# Patient Record
Sex: FEMALE | Race: WHITE | Employment: UNEMPLOYED
[De-identification: names, ages, dates, MRNs, and addresses within clinical notes are randomized per-mention and may not be internally consistent; named-entity substitution may affect disease eponyms.]

---
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
## 2021-02-25 ENCOUNTER — OFFICE VISIT (OUTPATIENT)
Dept: OBGYN | Age: 21
End: 2021-02-25
Payer: COMMERCIAL

## 2021-02-25 VITALS
DIASTOLIC BLOOD PRESSURE: 82 MMHG | BODY MASS INDEX: 33.15 KG/M2 | WEIGHT: 199 LBS | SYSTOLIC BLOOD PRESSURE: 128 MMHG | HEIGHT: 65 IN

## 2021-02-25 DIAGNOSIS — N83.201 CYST OF RIGHT OVARY: Primary | ICD-10-CM

## 2021-02-25 DIAGNOSIS — N93.8 DUB (DYSFUNCTIONAL UTERINE BLEEDING): ICD-10-CM

## 2021-02-25 PROCEDURE — G8417 CALC BMI ABV UP PARAM F/U: HCPCS | Performed by: ADVANCED PRACTICE MIDWIFE

## 2021-02-25 PROCEDURE — G8484 FLU IMMUNIZE NO ADMIN: HCPCS | Performed by: ADVANCED PRACTICE MIDWIFE

## 2021-02-25 PROCEDURE — 99213 OFFICE O/P EST LOW 20 MIN: CPT | Performed by: ADVANCED PRACTICE MIDWIFE

## 2021-02-25 PROCEDURE — 1036F TOBACCO NON-USER: CPT | Performed by: ADVANCED PRACTICE MIDWIFE

## 2021-02-25 PROCEDURE — G8427 DOCREV CUR MEDS BY ELIG CLIN: HCPCS | Performed by: ADVANCED PRACTICE MIDWIFE

## 2021-02-25 NOTE — PROGRESS NOTES
PROBLEM VISIT     Date of service: 2021    Moore Reyna  Is a 24 y.o.  female    PT's PCP is: OSWALDO Vail - CNP     : 2000                                             Subjective:       No LMP recorded (lmp unknown). Patient has had an implant. OB History    Para Term  AB Living   3 3 3 0 0 3   SAB TAB Ectopic Molar Multiple Live Births   0 0 0   0 3      # Outcome Date GA Lbr Abdiel/2nd Weight Sex Delivery Anes PTL Lv   3 Term 20 38w3d / 00:04 6 lb 12.7 oz (3.081 kg) M Vag-Spont None N KAY   2 Term 19 38w6d 05:11 / 00:01 6 lb 0.9 oz (2.747 kg) F Vag-Spont  N KAY      Complications: Oligohydramnios   1 Term 05/15/17 39w5d 12:11 / 00:52 7 lb 7.5 oz (3.388 kg) M Vag-Spont EPI N KAY        Social History     Tobacco Use   Smoking Status Passive Smoke Exposure - Never Smoker   Smokeless Tobacco Never Used        Social History     Substance and Sexual Activity   Alcohol Use No       Social History     Substance and Sexual Activity   Sexual Activity Yes    Partners: Male    Birth control/protection: I.U.D. Comment: Mirena        Allergies: Patient has no known allergies. Chief Complaint   Patient presents with    Ovarian Cyst     Pt here for follow up ultrasound with concerns of an ovarian cyst. Pt states no questions or concerns otherwise at this time. Last Yearly:  never    Last pap: never    Last HPV: never    PE:  Vital Signs  Blood pressure 128/82, height 5' 5\" (1.651 m), weight 199 lb (90.3 kg), not currently breastfeeding. Estimated body mass index is 33.12 kg/m² as calculated from the following:    Height as of this encounter: 5' 5\" (1.651 m). Weight as of this encounter: 199 lb (90.3 kg).     No data recorded      NURSE: Ellie    HPI: Patient here today for review of right ovarian cyst.  Patient states she is feeling better at this time    Yes  PT denies fever, chills, nausea and vomiting           Results reviewed today: UTERUS: Homogenous appearing retroverted uterus, WNL     ENDO: measures 5 mm, IUD noted in good position     RT. OVARY: WNL, complete resolution of 5 cm simple cyst     LT. OVARY: 3 x 2.5 x 3 cm simple cyst     Trace free fluid in cul-de-sac                                                   Assessment and Plan          Diagnosis Orders   1. Cyst of right ovary     2. DUB (dysfunctional uterine bleeding)         We did review normal cystic activity with Mirena IUDs plan of care reviewed      I am having Debra Balzarine. Michael maintain her traZODone, VORTIoxetine, and Mirena (52 MG). Return in about 1 year (around 2/25/2022) for yearly no pap. She was also counseled on her preventative health maintenance recommendations and follow-up. There are no Patient Instructions on file for this visit.     Caitlin Aparicio,2/25/2021 11:12 AM

## 2021-06-15 ENCOUNTER — HOSPITAL ENCOUNTER (EMERGENCY)
Age: 21
Discharge: HOME | End: 2021-06-15
Attending: EMERGENCY MEDICINE
Payer: COMMERCIAL

## 2021-06-15 VITALS
DIASTOLIC BLOOD PRESSURE: 95 MMHG | OXYGEN SATURATION: 100 % | HEART RATE: 83 BPM | RESPIRATION RATE: 18 BRPM | SYSTOLIC BLOOD PRESSURE: 151 MMHG | TEMPERATURE: 97.7 F

## 2021-06-15 DIAGNOSIS — K02.9 DENTAL CARIES: ICD-10-CM

## 2021-06-15 DIAGNOSIS — K08.89 PAIN, DENTAL: Primary | ICD-10-CM

## 2021-06-15 PROCEDURE — 99283 EMERGENCY DEPT VISIT LOW MDM: CPT

## 2021-06-15 PROCEDURE — 6370000000 HC RX 637 (ALT 250 FOR IP): Performed by: EMERGENCY MEDICINE

## 2021-06-15 RX ORDER — PENICILLIN V POTASSIUM 250 MG/1
500 TABLET ORAL ONCE
Status: COMPLETED | OUTPATIENT
Start: 2021-06-15 | End: 2021-06-15

## 2021-06-15 RX ORDER — NAPROXEN SODIUM 550 MG/1
550 TABLET ORAL ONCE
Status: COMPLETED | OUTPATIENT
Start: 2021-06-15 | End: 2021-06-15

## 2021-06-15 RX ORDER — NAPROXEN 250 MG/1
250 TABLET ORAL 2 TIMES DAILY WITH MEALS
Qty: 180 TABLET | Refills: 1 | Status: SHIPPED | OUTPATIENT
Start: 2021-06-15 | End: 2022-05-26 | Stop reason: ALTCHOICE

## 2021-06-15 RX ORDER — PENICILLIN V POTASSIUM 500 MG/1
500 TABLET ORAL 4 TIMES DAILY
Qty: 20 TABLET | Refills: 0 | Status: SHIPPED | OUTPATIENT
Start: 2021-06-15 | End: 2021-06-25

## 2021-06-15 RX ADMIN — NAPROXEN SODIUM 550 MG: 550 TABLET, FILM COATED ORAL at 22:49

## 2021-06-15 RX ADMIN — PENICILLIN V POTASIUM 500 MG: 250 TABLET ORAL at 22:49

## 2021-06-15 ASSESSMENT — PAIN DESCRIPTION - PAIN TYPE: TYPE: ACUTE PAIN

## 2021-06-15 ASSESSMENT — PAIN SCALES - GENERAL
PAINLEVEL_OUTOF10: 10
PAINLEVEL_OUTOF10: 10

## 2021-06-15 ASSESSMENT — PAIN DESCRIPTION - DESCRIPTORS: DESCRIPTORS: STABBING

## 2021-06-15 ASSESSMENT — PAIN DESCRIPTION - LOCATION: LOCATION: TEETH

## 2021-06-15 ASSESSMENT — PAIN DESCRIPTION - ORIENTATION: ORIENTATION: LEFT

## 2021-06-16 NOTE — ED PROVIDER NOTES
as of 6/15/2021 10:45 PM      CONTINUE these medications which have NOT CHANGED    Details   levonorgestrel (MIRENA, 52 MG,) IUD 52 mg 1 each by Intrauterine route once, Intrauterine, ONCE, Historical Med             ALLERGIES     Patient has no known allergies. FAMILY HISTORY     History reviewed. No pertinent family history. SOCIAL HISTORY       Social History     Socioeconomic History    Marital status: Single     Spouse name: None    Number of children: None    Years of education: None    Highest education level: None   Occupational History    None   Tobacco Use    Smoking status: Passive Smoke Exposure - Never Smoker    Smokeless tobacco: Never Used   Vaping Use    Vaping Use: Never used   Substance and Sexual Activity    Alcohol use: No    Drug use: No    Sexual activity: Yes     Partners: Male     Birth control/protection: I.U.D. Comment: Mirena    Other Topics Concern    None   Social History Narrative    ** Merged History Encounter **          Social Determinants of Health     Financial Resource Strain:     Difficulty of Paying Living Expenses:    Food Insecurity:     Worried About Running Out of Food in the Last Year:     [address] in the Last Year:    Transportation Needs:     Lack of Transportation (Medical):      Lack of Transportation (Non-Medical):    Physical Activity:     Days of Exercise per Week:     Minutes of Exercise per Session:    Stress:     Feeling of Stress :    Social Connections:     Frequency of Communication with Friends and Family:     Frequency of Social Gatherings with Friends and Family:     Attends [employer] Services:     Active Member of Clubs or Organizations:     Attends Club or Organization Meetings:     Marital Status:    Intimate Partner Violence:     Fear of Current or Ex-Partner:     Emotionally Abused:     Physically Abused:     Sexually Abused:        SCREENINGS                        PHYSICAL EXAM    (up to 7 for level 4, 8 or more for level 5)     ED Triage Vitals [06/15/21 2224]   BP Temp Temp Source Pulse Resp SpO2 Height Weight   (!) 151/95 97.7 °F (36.5 °C) Tympanic 83 18 100 % -- --       Physical Exam  Vitals and nursing note reviewed. Constitutional:       General: She is not in acute distress. Appearance: Normal appearance. She is not ill-appearing, toxic-appearing or diaphoretic. HENT:      Head: Normocephalic. Right Ear: Tympanic membrane normal.      Left Ear: Tympanic membrane normal.      Nose: Nose normal. No congestion or rhinorrhea. Mouth/Throat:      Mouth: Mucous membranes are moist.      Comments: Multiple dental caries specifically involving the wisdom teeth-urgent gingival swelling circumventing the left lower third molar    There is no evidence of any trismus appreciated-no impairment of the swallowing mechanism-no clinical evidence to support Keysha's angina  Eyes:      General: No scleral icterus. Pupils: Pupils are equal, round, and reactive to light. Skin:     Findings: No lesion or rash. Neurological:      General: No focal deficit present. Mental Status: She is alert. Cranial Nerves: No cranial nerve deficit. Sensory: No sensory deficit. Motor: No weakness.       Coordination: Coordination normal.         DIAGNOSTIC RESULTS     EKG: All EKG's are interpreted by the Emergency Department Physician who either signs or Co-signs this chart in the absence of a cardiologist.      RADIOLOGY:   Non-plain film images such as CT, Ultrasound and MRI are read by the radiologist. Plain radiographic images are visualized and preliminarily interpreted by the emergency physician with the below findings:      Interpretation per the Radiologist below, if available at the time of this note:    No orders to display         ED BEDSIDE ULTRASOUND:   Performed by ED Physician - none    LABS:  Labs Reviewed - No data to display    All other labs were within normal range or not returned as of this dictation. EMERGENCY DEPARTMENT COURSE and DIFFERENTIAL DIAGNOSIS/MDM:   Vitals:    Vitals:    06/15/21 2224   BP: (!) 151/95   Pulse: 83   Resp: 18   Temp: 97.7 °F (36.5 °C)   TempSrc: Tympanic   SpO2: 100%         MDM  Number of Diagnoses or Management Options  Dental caries  Pain, dental  Diagnosis management comments: 71-year-old female presents emergency department with concerns of discomfort left lower wisdom tooth with swelling a forementioned region. Patient will be initiated on antibiotics advised Tylenol ibuprofen or Naprosyn as directed for control discomfort    There are no red flags    Patient advised keep follow-up appointment with her dentist as previously arranged and scheduled        REASSESSMENT              CRITICAL CARE TIME   Total Critical Care time was minutes, excluding separately reportable procedures. There was a high probability of clinically significant/life threatening deterioration in the patient's condition which required my urgent intervention. CONSULTS:  None    PROCEDURES:  Unless otherwise noted below, none     Procedures    FINAL IMPRESSION      1. Pain, dental    2. Dental caries          DISPOSITION/PLAN   DISPOSITION Decision To Discharge 06/15/2021 10:57:06 PM      PATIENT REFERRED TO:  Your dentist    Call in 2 days        DISCHARGE MEDICATIONS:  Discharge Medication List as of 6/15/2021 10:45 PM      START taking these medications    Details   penicillin v potassium (VEETID) 500 MG tablet Take 1 tablet by mouth 4 times daily for 10 days, Disp-20 tablet, R-0Normal      naproxen (NAPROSYN) 250 MG tablet Take 1 tablet by mouth 2 times daily (with meals), Disp-180 tablet, R-1Normal           Controlled Substances Monitoring:     No flowsheet data found.     (Please note that portions of this note were completed with a voice recognition program.  Efforts were made to edit the dictations but occasionally words are mis-transcribed.)    Ariella Lloyd MD (electronically signed)  Attending Emergency Physician            Vince Amato MD  06/17/21 2024

## 2021-06-17 ASSESSMENT — ENCOUNTER SYMPTOMS
GASTROINTESTINAL NEGATIVE: 1
EYES NEGATIVE: 1
RESPIRATORY NEGATIVE: 1
ALLERGIC/IMMUNOLOGIC NEGATIVE: 1

## 2021-10-06 ENCOUNTER — PROCEDURE VISIT (OUTPATIENT)
Dept: OBGYN | Age: 21
End: 2021-10-06
Payer: COMMERCIAL

## 2021-10-06 VITALS
SYSTOLIC BLOOD PRESSURE: 132 MMHG | HEIGHT: 65 IN | WEIGHT: 181 LBS | BODY MASS INDEX: 30.16 KG/M2 | DIASTOLIC BLOOD PRESSURE: 80 MMHG

## 2021-10-06 DIAGNOSIS — Z30.432 ENCOUNTER FOR IUD REMOVAL: Primary | ICD-10-CM

## 2021-10-06 PROCEDURE — 58301 REMOVE INTRAUTERINE DEVICE: CPT | Performed by: ADVANCED PRACTICE MIDWIFE

## 2021-10-06 NOTE — PROGRESS NOTES
follow-up. ASSESSMENT:   Diagnosis Orders   1.  Encounter for IUD removal  NC REMOVE INTRAUTERINE DEVICE       IUD Removal  Patient Active Problem List    Diagnosis Date Noted    Major depression, chronic 2020    Acetaminophen overdose, intentional self-harm, initial encounter ([facility] 2020    Amniotic fluid leaking 2020    Uterine irritability     Rupture, membranes, premature 2020    Amenorrhea 2020    Impaired glucose tolerance in pregnancy 2020    Sprain of right ankle 2020    Female bladder prolapse 2019     Pt has a pessary placed       (normal spontaneous vaginal delivery)     Concussion with less than 1 hour loss of consciousness 2013         Notify office of positive home pregnancy test

## 2022-05-26 ENCOUNTER — INITIAL PRENATAL (OUTPATIENT)
Dept: OBGYN | Age: 22
End: 2022-05-26
Payer: COMMERCIAL

## 2022-05-26 ENCOUNTER — HOSPITAL ENCOUNTER (OUTPATIENT)
Age: 22
Setting detail: SPECIMEN
Discharge: HOME | End: 2022-05-26
Payer: COMMERCIAL

## 2022-05-26 VITALS — WEIGHT: 184 LBS | SYSTOLIC BLOOD PRESSURE: 116 MMHG | DIASTOLIC BLOOD PRESSURE: 72 MMHG | BODY MASS INDEX: 30.62 KG/M2

## 2022-05-26 DIAGNOSIS — N91.2 AMENORRHEA: ICD-10-CM

## 2022-05-26 DIAGNOSIS — Z34.82 ENCOUNTER FOR SUPERVISION OF OTHER NORMAL PREGNANCY IN SECOND TRIMESTER: Primary | ICD-10-CM

## 2022-05-26 DIAGNOSIS — Z34.82 ENCOUNTER FOR SUPERVISION OF OTHER NORMAL PREGNANCY IN SECOND TRIMESTER: ICD-10-CM

## 2022-05-26 LAB
ABSOLUTE EOS #: 0.08 K/UL (ref 0–0.44)
ABSOLUTE IMMATURE GRANULOCYTE: 0.03 K/UL (ref 0–0.3)
ABSOLUTE LYMPH #: 1.25 K/UL (ref 1.1–3.7)
ABSOLUTE MONO #: 0.38 K/UL (ref 0.1–1.4)
AMPHETAMINE SCREEN URINE: NEGATIVE
BARBITURATE SCREEN URINE: NEGATIVE
BASOPHILS # BLD: 1 % (ref 0–2)
BASOPHILS ABSOLUTE: 0.03 K/UL (ref 0–0.2)
BENZODIAZEPINE SCREEN, URINE: NEGATIVE
BUPRENORPHINE URINE: NEGATIVE
CANNABINOID SCREEN URINE: NEGATIVE
COCAINE METABOLITE, URINE: NEGATIVE
EOSINOPHILS RELATIVE PERCENT: 1 % (ref 1–4)
HCT VFR BLD CALC: 34.9 % (ref 36.3–47.1)
HEMOGLOBIN: 12 G/DL (ref 11.9–15.1)
HEPATITIS B SURFACE ANTIGEN: NONREACTIVE
HEPATITIS C ANTIBODY: NONREACTIVE
IMMATURE GRANULOCYTES: 1 %
LYMPHOCYTES # BLD: 19 % (ref 25–45)
MCH RBC QN AUTO: 29.9 PG (ref 25.2–33.5)
MCHC RBC AUTO-ENTMCNC: 34.4 G/DL (ref 28.4–34.8)
MCV RBC AUTO: 87 FL (ref 82.6–102.9)
METHADONE SCREEN, URINE: NEGATIVE
METHAMPHETAMINE, URINE: NEGATIVE
MONOCYTES # BLD: 6 % (ref 2–8)
NRBC AUTOMATED: 0 PER 100 WBC
OPIATES, URINE: NEGATIVE
OXYCODONE SCREEN URINE: NEGATIVE
PDW BLD-RTO: 13 % (ref 11.8–14.4)
PHENCYCLIDINE, URINE: NEGATIVE
PLATELET # BLD: 215 K/UL (ref 138–453)
PMV BLD AUTO: 9.8 FL (ref 8.1–13.5)
PROPOXYPHENE, URINE: NEGATIVE
RBC # BLD: 4.01 M/UL (ref 3.95–5.11)
RUBV IGG SER QL: 38.8 IU/ML
SEG NEUTROPHILS: 72 % (ref 34–64)
SEGMENTED NEUTROPHILS ABSOLUTE COUNT: 4.7 K/UL (ref 1.5–8.1)
T. PALLIDUM, IGG: NONREACTIVE
TRICYCLIC ANTIDEPRESSANTS, UR: NEGATIVE
WBC # BLD: 6.5 K/UL (ref 4.5–13.5)

## 2022-05-26 PROCEDURE — 36415 COLL VENOUS BLD VENIPUNCTURE: CPT | Performed by: ADVANCED PRACTICE MIDWIFE

## 2022-05-26 PROCEDURE — G8427 DOCREV CUR MEDS BY ELIG CLIN: HCPCS | Performed by: ADVANCED PRACTICE MIDWIFE

## 2022-05-26 PROCEDURE — 80306 DRUG TEST PRSMV INSTRMNT: CPT

## 2022-05-26 PROCEDURE — 99211 OFF/OP EST MAY X REQ PHY/QHP: CPT | Performed by: ADVANCED PRACTICE MIDWIFE

## 2022-05-26 PROCEDURE — 87591 N.GONORRHOEAE DNA AMP PROB: CPT

## 2022-05-26 PROCEDURE — H1000 PRENATAL CARE ATRISK ASSESSM: HCPCS | Performed by: ADVANCED PRACTICE MIDWIFE

## 2022-05-26 PROCEDURE — 86762 RUBELLA ANTIBODY: CPT

## 2022-05-26 PROCEDURE — 87340 HEPATITIS B SURFACE AG IA: CPT

## 2022-05-26 PROCEDURE — 86803 HEPATITIS C AB TEST: CPT

## 2022-05-26 PROCEDURE — 85025 COMPLETE CBC W/AUTO DIFF WBC: CPT

## 2022-05-26 PROCEDURE — G8417 CALC BMI ABV UP PARAM F/U: HCPCS | Performed by: ADVANCED PRACTICE MIDWIFE

## 2022-05-26 PROCEDURE — 87491 CHLMYD TRACH DNA AMP PROBE: CPT

## 2022-05-26 PROCEDURE — 87086 URINE CULTURE/COLONY COUNT: CPT

## 2022-05-26 PROCEDURE — 86780 TREPONEMA PALLIDUM: CPT

## 2022-05-26 NOTE — PROGRESS NOTES
New OB Visit    Date of service: 2022    Yousif Calderon  Is a 25 y.o. single female presenting for a New OB visit with Nurse. Name of Father of Muna Jimenez is Adonis Castro and is involved. Pt does not work   Pt is not Fertility pt. PT's PCP is: None Provider     : 2000                                             Subjective:       Patient's last menstrual period was 2021 (approximate). OB History    Para Term  AB Living   4 3 3 0 0 3   SAB IAB Ectopic Molar Multiple Live Births   0 0 0   0 3      # Outcome Date GA Lbr Abdiel/2nd Weight Sex Delivery Anes PTL Lv   4 Current            3 Term 20 38w3d / 00:04 6 lb 12.7 oz (3.081 kg) M Vag-Spont None N KAY   2 Term 19 38w6d 05:11 / 00:01 6 lb 0.9 oz (2.747 kg) F Vag-Spont  N KAY      Complications: Oligohydramnios   1 Term 05/15/17 39w5d 12:11 / 00:52 7 lb 7.5 oz (3.388 kg) M Vag-Spont EPI N KAY             Social History     Tobacco Use   Smoking Status Passive Smoke Exposure - Never Smoker   Smokeless Tobacco Never Used        Social History     Substance and Sexual Activity   Alcohol Use No        Allergies: Patient has no known allergies. Current Outpatient Medications:     Prenatal Vit-Fe Fumarate-FA (PRENATAL VITAMINS PO), Take by mouth, Disp: , Rfl:       Vital Signs Blood pressure 116/72, weight 184 lb (83.5 kg), last menstrual period 2021, not currently breastfeeding. No results found for this visit on 22. Pain: none                            Nausea: no      Vomiting: no        Breast enlargement or tenderness: yes    Frequency of urination:yes      Fatigue: yes         Patient history reviewed. Educational materials given and genetic testing reviewed.       Breastfeeding handouts given and reviewed: Yes     If BMI over 35 was HgA1C drawn: N/A      If history of thyroid problem was TSH drawn: N/A       My chart set up and activated: Yes    If history of preeclampsia did we start baby ASA: sodas and other sweetened drinks.  Choose foods that have important vitamins for your baby, such as calcium, iron, and folate. ? Dairy products, tofu, canned fish with bones, almonds, broccoli, dark leafy greens, corn tortillas, and fortified orange juice are good sources of calcium. ? Beef, poultry, liver, spinach, lentils, dried beans, fortified cereals, and dried fruits are rich in iron. ? Dark leafy greens, broccoli, asparagus, liver, fortified cereals, orange juice, peanuts, and almonds are good sources of folate.  Avoid foods that could harm your baby. ? Do not eat raw or undercooked meat, chicken, or fish (such as sushi or raw oysters). ? Do not eat raw eggs or foods that contain raw eggs, such as Caesar dressing. ? Do not eat soft cheeses and unpasteurized dairy foods, such as Brie, feta, or blue cheese. ? Do not eat fish that contains a lot of mercury, such as shark, swordfish, tilefish, or shawn mackerel. Limit some other types of fish, such as white (albacore) tuna, to 4 oz (0.1 kg) a week. ? Do not eat raw sprouts, especially alfalfa sprouts. ? Cut down on caffeine, such as coffee, tea, and cola. Protect yourself and your baby   Do not touch lolis litter or cat feces. They can cause an infection that could harm your baby.  Avoid things that can make your body too hot and may be harmful to your baby, such as a hot tub or sauna. Or talk with your doctor before doing anything that raises your body temperature. Your doctor can tell you if it's safe. [place] with morning sickness   Sip small amounts of water, juices, or shakes. Try drinking between meals, not with meals.  Eat 5 or 6 small meals a day. Try dry toast or crackers when you first get up, and eat breakfast a little later.  Avoid spicy, greasy, and fatty foods.  When you feel sick, open your windows or go for a short walk to get fresh air.  Try nausea wristbands. These help some people.    Tell your doctor if you think your prenatal vitamins make you sick. Where can you learn more? Go to https://chpepiceweb.healthMagneGas Corporation. org and sign in to your [employer] account. Enter G112 in the [employer] box to learn more about \"Weeks 6 to 10 of Your Pregnancy: Care Instructions. \"     If you do not have an account, please click on the \"Sign Up Now\" link. Current as of: June 16, 2021               Content Version: 13.2  © 2006-2022 Healthwise, [employer]. Care instructions adapted under license by [facility] ([facility]). If you have questions about a medical condition or this instruction, always ask your healthcare professional. [place] [address] any warranty or liability for your use of this information. Patient Education        Learning About Starting to Breastfeed  Planning ahead     Before your baby is born, plan ahead. Learn all you can about breastfeeding. This helps make breastfeeding easier.  Early in your pregnancy, talk to your doctor or midwife about breastfeeding.  Learn the basics before your baby is born. The staff at hospitals and birthing centers can help you find a lactation specialist. This person is often a nurse who has been trained to teach and advise about breastfeeding. Or you can take a breastfeeding class.  Plan ahead for times when you will need help after your baby is born. You may want to get help from friends and family. You can also join a support group to talk to others who breastfeed.  Buy the equipment you'll need. Examples are breast pads, nipple cream, extra pillows, and nursing bras. Find out about breast pumps too. Getting help from your hospital or birthing center  It's important to have support from the doctors, nurses, and hospital staff who care for you and your baby. Before it's time for you to give birth, ask about the breastfeeding policies at your hospital or birthing center. Look for ospital or birthing center that has policies for:   \"Rooming in. \" This policy encourages you to have your baby in the room with you. It can allow you to breastfeed more often.  Supplemental feedings. Tell the staff that your baby is to get only your breast milk from birth. If staff feed your baby water, sugar solution, or formula right after birth without a medical reason, it may make it harder for you to breastfeed.  Pacifiers or artificial nipples. Staff should not give your  these items. They may interfere with breastfeeding.  Follow-up. Find out if your hospital can help you with breastfeeding issues after you go home. See if you can get information on support groups or other contacts. They might help if you need help setting up and staying with your breastfeeding routine. Your first feeding  It's best to start breastfeeding within 1 hour of birth. For each feeding, yougo through these basic steps:   Get ready for the feeding. Be calm and relaxed, and try not to be distracted. Get some water or juice for yourself. Use two or three pillows to help support your baby while nursing.  Find a breastfeeding position that is comfortable for you and your baby. Examples are the cradle and the football positions. Make sure the baby's head and chest are lined up straight and facing your breast. It's best to switch which breast you start with each time.  Get the baby latched on well. Your baby's mouth needs to be wide open, like a yawn. So you may need to gently touch the middle of your baby's lower lip. When your baby's mouth is open wide, quickly bring the baby onto your nipple and areola. The areola is the dark [place] around your nipple.  Provide a complete feeding. Let your baby decide how long to nurse. Be sure to burp your baby after each breast.  In the first days after birth, your breasts make a thick, yellow liquid called colostrum. This liquid gives your baby nutrients and antibodies against infection.  It is all that babies need at first. Your breasts will fill withmilk a few days after the birth. Talk to your doctor, midwife, or lactation specialist right away if you arehaving problems and aren't sure what to do. How often to breastfeed  Plan to breastfeed your baby on demand rather than setting a strict schedule. For the first 2 weeks, be prepared to breastfeed at least 8 times in a 24-hour period. In the first few days, you may need to wake a sleeping baby to feed. Ifyou breastfeed more often, it will help your breasts to produce more milk. After you go home  After you're home, don't be afraid to call your doctor, midwife, or lactation specialist with questions. That's true even if you don't know what's bothering you. They are used to parents of newborns calling. They can help you figure outif there is a problem, and if so, how to fix it. Plan for times when you will be apart from your baby. Use a breast pump to collect breast milk ahead of time. You can store milk in the refrigerator or freezer. Then it's ready when someone else will be taking care of your baby. Experts recommend waiting about a month until breastfeeding is going wellbefore offering a bottle. Breastfeeding is a learned skill that gets easier over time. You are more likely to succeed if you plan ahead, learn the basic techniques, and know whereto get help and support. Where can you learn more? Go to https://"[employer]pepiceweb.Chance (app). org and sign in to your MyTrade account. Enter D633 in the [facility] box to learn more about \"Learning About Starting to Breastfeed. \"     If you do not have an account, please click on the \"Sign Up Now\" link. Current as of: June 16, 2021               Content Version: 13.2  © 4925-3076 Healthwise, Incorporated. Care instructions adapted under license by [facility] ([facility]).  If you have questions about a medical condition or this instruction, always ask your healthcare professional. [address] any warranty or liability for your use of this information. Childbirth Education 2022 and  530 to 9 PM    May 14 Saturday 9am -to 4 PM    Eli 14 and 21 Tuesdays 5:30 PM to 9 PM     9 AM to 4 PM     and  5:30 PM to 9 PM     and  5:30 PM to 9 PM    October 15 Saturday 9 AM to 4 PM     and  5:30 PM to 9 PM      There is no charge for classes. Please bring a pillow to class. Bring a support person. To sign up for classes  Call the Obstetrics Department at  [facility] at  [phone]          BREASTFEEDING classes     Classes are held in the [address] [facility]     Breastfeeding     ZOOM CLASS- (Wednesday 12 pm-1:30 pm)       (Tuesday 11 am - 1 pm)      (Monday 5:30-7:30 pm)     5:30 PM to 7:30 PM    May 17 Tuesday 5:30 PM to 7:30 PM     530 to 7:30 PM     530 to 7:30 PM     530 to 7:30 PM     530 to 7:30 PM     530 to 7:30 PM     530 to 7:30 PM        these classes are free    To sign up for classes  call the Obstetrics Department at  [facility] at  [phone]  More classes will be added as instructor availability increases. Thank you for your patience! Learn and GO with Nayana              Scan the QR Code  below to access   parent education powered  by [employer]. [employer] gives you access to:     Prenatal   Labor and birth   Postpartum care   Breastfeeding   [place] care  Scan the QR code to access  [employer] at [facility].                        Assessment:      Diagnosis Orders   1.  Encounter for supervision of other normal pregnancy in second trimester  C.trachomatis N.gonorrhoeae DNA, Urine    Hepatitis C Antibody    Prenatal Profile I    Urine Drug Screen, Comprehensive    Culture, Urine   2.  Amenorrhea  C.trachomatis N.gonorrhoeae DNA, Urine    Hepatitis C Antibody    Prenatal Profile I    Urine Drug Screen, Comprehensive    Culture, Urine       Plan: Order Routine Prenatal Lab No: some were ordered by previous OB - scanned into media     Order additional testing if requested         Order Prenatal Vitamins   No: pt already taking             Appointment with Monica Torres CNM in 1 week for Dating U/S and total body exam if indicated      Nurse:   Luci Blair

## 2022-05-26 NOTE — PATIENT INSTRUCTIONS
Patient Education        Weeks 6 to 10 of Your Pregnancy: Care Instructions  Overview     During the first 6 to 10 weeks of your pregnancy, your body goes through many changes. Your baby grows very quickly, even though you can't feel it yet. You may start to feel different, both in your body and your emotions. Because each pregnancy is unique, there's no right way to feel. You may feel the healthiest you've ever been, or you might feel tired or sick to your stomach (\"morningsickness\"). These early weeks are a time to make healthy choices and to eat the best foodsfor you and your baby. This is also a good time to think about birth defects testing. These are tests done during pregnancy to look for possible problems with the baby. First-trimester tests for birth defects can be done between 8 and 13 weeks of pregnancy, depending on the test. Talk with your doctor about what kinds oftests are available. Follow-up care is a key part of your treatment and safety. Be sure to make and go to all appointments, and call your doctor if you are having problems. It's also a good idea to know your test results and keep alist of the medicines you take. How can you care for yourself at home? Eat well   Eat at least 3 meals and 2 healthy snacks every day. Eat fresh, whole foods, including:  ? 7 or more servings of bread, tortillas, cereal, rice, pasta, or oatmeal.  ? 3 or more servings of vegetables, especially leafy green vegetables. ? 2 or more servings of fruits. ? 3 or more servings of milk, yogurt, or cheese. ? 2 or more servings of meat, turkey, chicken, fish, eggs, or dried beans.  Drink plenty of fluids, especially water. Avoid sodas and other sweetened drinks.  Choose foods that have important vitamins for your baby, such as calcium, iron, and folate.   ? Dairy products, tofu, canned fish with bones, almonds, broccoli, dark leafy greens, corn tortillas, and fortified orange juice are good sources of calcium. ? Beef, poultry, liver, spinach, lentils, dried beans, fortified cereals, and dried fruits are rich in iron. ? Dark leafy greens, broccoli, asparagus, liver, fortified cereals, orange juice, peanuts, and almonds are good sources of folate.  Avoid foods that could harm your baby. ? Do not eat raw or undercooked meat, chicken, or fish (such as sushi or raw oysters). ? Do not eat raw eggs or foods that contain raw eggs, such as Caesar dressing. ? Do not eat soft cheeses and unpasteurized dairy foods, such as Brie, feta, or blue cheese. ? Do not eat fish that contains a lot of mercury, such as shark, swordfish, tilefish, or shawn mackerel. Limit some other types of fish, such as white (albacore) tuna, to 4 oz (0.1 kg) a week. ? Do not eat raw sprouts, especially alfalfa sprouts. ? Cut down on caffeine, such as coffee, tea, and cola. Protect yourself and your baby   Do not touch lolis litter or cat feces. They can cause an infection that could harm your baby.  Avoid things that can make your body too hot and may be harmful to your baby, such as a hot tub or sauna. Or talk with your doctor before doing anything that raises your body temperature. Your doctor can tell you if it's safe. [place] with morning sickness   Sip small amounts of water, juices, or shakes. Try drinking between meals, not with meals.  Eat 5 or 6 small meals a day. Try dry toast or crackers when you first get up, and eat breakfast a little later.  Avoid spicy, greasy, and fatty foods.  When you feel sick, open your windows or go for a short walk to get fresh air.  Try nausea wristbands. These help some people.  Tell your doctor if you think your prenatal vitamins make you sick. Where can you learn more? Go to https://toni.health-partners. org and sign in to your OnRamp Digital account. Enter G112 in the [facility] box to learn more about \"Weeks 6 to 10 of Your Pregnancy: Care Instructions. \"     If you do not have an account, please click on the \"Sign Up Now\" link. Current as of: June 16, 2021               Content Version: 13.2  © 2006-2022 Healthwise, Incorporated. Care instructions adapted under license by [facility] ([facility]). If you have questions about a medical condition or this instruction, always ask your healthcare professional. [address] any warranty or liability for your use of this information. Patient Education        Learning About Starting to Breastfeed  Planning ahead     Before your baby is born, plan ahead. Learn all you can about breastfeeding. This helps make breastfeeding easier.  Early in your pregnancy, talk to your doctor or midwife about breastfeeding.  Learn the basics before your baby is born. The staff at hospitals and birthing centers can help you find a lactation specialist. This person is often a nurse who has been trained to teach and advise about breastfeeding. Or you can take a breastfeeding class.  Plan ahead for times when you will need help after your baby is born. You may want to get help from friends and family. You can also join a support group to talk to others who breastfeed.  Buy the equipment you'll need. Examples are breast pads, nipple cream, extra pillows, and nursing bras. Find out about breast pumps too. Getting help from your hospital or birthing center  It's important to have support from the doctors, nurses, and hospital staff who care for you and your baby. Before it's time for you to give birth, ask about the breastfeeding policies at your hospital or birthing center. Look for [facility] or birthing center that has policies for:   \"Rooming in. \" This policy encourages you to have your baby in the room with you. It can allow you to breastfeed more often.  Supplemental feedings. Tell the staff that your baby is to get only your breast milk from birth.  If staff feed your baby water, sugar solution, or formula right after birth without a medical reason, it may make it harder for you to breastfeed.  Pacifiers or artificial nipples. Staff should not give your  these items. They may interfere with breastfeeding.  Follow-up. Find out if your hospital can help you with breastfeeding issues after you go home. See if you can get information on support groups or other contacts. They might help if you need help setting up and staying with your breastfeeding routine. Your first feeding  It's best to start breastfeeding within 1 hour of birth. For each feeding, yougo through these basic steps:   Get ready for the feeding. Be calm and relaxed, and try not to be distracted. Get some water or juice for yourself. Use two or three pillows to help support your baby while nursing.  Find a breastfeeding position that is comfortable for you and your baby. Examples are the cradle and the football positions. Make sure the baby's head and chest are lined up straight and facing your breast. It's best to switch which breast you start with each time.  Get the baby latched on well. Your baby's mouth needs to be wide open, like a yawn. So you may need to gently touch the middle of your baby's lower lip. When your baby's mouth is open wide, quickly bring the baby onto your nipple and areola. The areola is the dark [place] around your nipple.  Provide a complete feeding. Let your baby decide how long to nurse. Be sure to burp your baby after each breast.  In the first days after birth, your breasts make a thick, yellow liquid called colostrum. This liquid gives your baby nutrients and antibodies against infection. It is all that babies need at first. Your breasts will fill withmilk a few days after the birth. Talk to your doctor, midwife, or lactation specialist right away if you arehaving problems and aren't sure what to do. How often to breastfeed  Plan to breastfeed your baby on demand rather than setting a strict schedule.  For the first 2 weeks, be prepared to breastfeed at least 8 times in a 24-hour period. In the first few days, you may need to wake a sleeping baby to feed. Ifyou breastfeed more often, it will help your breasts to produce more milk. After you go home  After you're home, don't be afraid to call your doctor, midwife, or lactation specialist with questions. That's true even if you don't know what's bothering you. They are used to parents of newborns calling. They can help you figure outif there is a problem, and if so, how to fix it. Plan for times when you will be apart from your baby. Use a breast pump to collect breast milk ahead of time. You can store milk in the refrigerator or freezer. Then it's ready when someone else will be taking care of your baby. Experts recommend waiting about a month until breastfeeding is going wellbefore offering a bottle. Breastfeeding is a learned skill that gets easier over time. You are more likely to succeed if you plan ahead, learn the basic techniques, and know whereto get help and support. Where can you learn more? Go to https://Armetheon.Cloud Direct. org and sign in to your Biletu account. Enter T170 in the [employer] box to learn more about \"Learning About Starting to Breastfeed. \"     If you do not have an account, please click on the \"Sign Up Now\" link. Current as of: June 16, 2021               Content Version: 13.2  © 4192-8959 [employer]INQ [employer]. Care instructions adapted under license by [employer]. If you have questions about a medical condition or this instruction, always ask your healthcare professional. [place] [address] any warranty or liability for your use of this information.                  Childbirth Education 2022 March 30 Wednesday and April 7 Thursday 530 to 9 PM    May 14 Saturday 9am -to 4 PM    Eli 14 and 21 Tuesdays 5:30 PM to 9 PM    July 23 Saturday 9 AM to 4 PM    August 9 and 16 Tuesdays 5:30 PM to 9 PM    September  and  5:30 PM to 9 PM    October 15 Saturday 9 AM to 4 PM    November 9 and 16 Wednesday 5:30 PM to 9 PM      There is no charge for classes. Please bring a pillow to class. Bring a support person. To sign up for classes  Call the Obstetrics Department at  [facility] at  [phone]          BREASTFEEDING classes     Classes are held in the [address] [facility]     Breastfeeding     ZOOM CLASS- (Wednesday 12 pm-1:30 pm)       (Tuesday 11 am - 1 pm)      (Monday 5:30-7:30 pm)     5:30 PM to 7:30 PM    May 17 Tuesday 5:30 PM to 7:30 PM     530 to 7:30 PM     530 to 7:30 PM     530 to 7:30 PM     530 to 7:30 PM     530 to 7:30 PM     530 to 7:30 PM        these classes are free    To sign up for classes  call the Obstetrics Department at  [facility] at  [phone]  More classes will be added as instructor availability increases. Thank you for your patience! Learn and GO with Nayana              Scan the QR Code  below to access   parent education powered  by [employer].     [employer] gives you access to:     Prenatal   Labor and birth   Postpartum care   Breastfeeding   [place] care  Scan the QR code to access  [employer] at [facility].

## 2022-05-27 LAB
C. TRACHOMATIS DNA ,URINE: NEGATIVE
CULTURE: NORMAL
N. GONORRHOEAE DNA, URINE: NEGATIVE
SPECIMEN DESCRIPTION: NORMAL
SPECIMEN DESCRIPTION: NORMAL

## 2022-05-31 ENCOUNTER — ROUTINE PRENATAL (OUTPATIENT)
Dept: OBGYN | Age: 22
End: 2022-05-31
Payer: COMMERCIAL

## 2022-05-31 VITALS
BODY MASS INDEX: 30.45 KG/M2 | WEIGHT: 183 LBS | HEART RATE: 75 BPM | SYSTOLIC BLOOD PRESSURE: 110 MMHG | DIASTOLIC BLOOD PRESSURE: 68 MMHG

## 2022-05-31 DIAGNOSIS — Z3A.21 21 WEEKS GESTATION OF PREGNANCY: Primary | ICD-10-CM

## 2022-05-31 DIAGNOSIS — Z34.82 ENCOUNTER FOR SUPERVISION OF OTHER NORMAL PREGNANCY IN SECOND TRIMESTER: ICD-10-CM

## 2022-05-31 PROCEDURE — G8417 CALC BMI ABV UP PARAM F/U: HCPCS | Performed by: ADVANCED PRACTICE MIDWIFE

## 2022-05-31 PROCEDURE — 1036F TOBACCO NON-USER: CPT | Performed by: ADVANCED PRACTICE MIDWIFE

## 2022-05-31 PROCEDURE — 99213 OFFICE O/P EST LOW 20 MIN: CPT | Performed by: ADVANCED PRACTICE MIDWIFE

## 2022-05-31 PROCEDURE — G8427 DOCREV CUR MEDS BY ELIG CLIN: HCPCS | Performed by: ADVANCED PRACTICE MIDWIFE

## 2022-05-31 ASSESSMENT — PATIENT HEALTH QUESTIONNAIRE - PHQ9
3. TROUBLE FALLING OR STAYING ASLEEP: 1
2. FEELING DOWN, DEPRESSED OR HOPELESS: 0
9. THOUGHTS THAT YOU WOULD BE BETTER OFF DEAD, OR OF HURTING YOURSELF: 0
7. TROUBLE CONCENTRATING ON THINGS, SUCH AS READING THE NEWSPAPER OR WATCHING TELEVISION: 0
8. MOVING OR SPEAKING SO SLOWLY THAT OTHER PEOPLE COULD HAVE NOTICED. OR THE OPPOSITE, BEING SO FIGETY OR RESTLESS THAT YOU HAVE BEEN MOVING AROUND A LOT MORE THAN USUAL: 0
4. FEELING TIRED OR HAVING LITTLE ENERGY: 1
SUM OF ALL RESPONSES TO PHQ QUESTIONS 1-9: 2
1. LITTLE INTEREST OR PLEASURE IN DOING THINGS: 0
5. POOR APPETITE OR OVEREATING: 0
SUM OF ALL RESPONSES TO PHQ QUESTIONS 1-9: 2
SUM OF ALL RESPONSES TO PHQ9 QUESTIONS 1 & 2: 0
6. FEELING BAD ABOUT YOURSELF - OR THAT YOU ARE A FAILURE OR HAVE LET YOURSELF OR YOUR FAMILY DOWN: 0
SUM OF ALL RESPONSES TO PHQ QUESTIONS 1-9: 2
10. IF YOU CHECKED OFF ANY PROBLEMS, HOW DIFFICULT HAVE THESE PROBLEMS MADE IT FOR YOU TO DO YOUR WORK, TAKE CARE OF THINGS AT HOME, OR GET ALONG WITH OTHER PEOPLE: 0
SUM OF ALL RESPONSES TO PHQ QUESTIONS 1-9: 2

## 2022-05-31 NOTE — PATIENT INSTRUCTIONS
Patient Education        Weeks 22 to 26 of Your Pregnancy: Care Instructions  Overview     As you enter your 7th month of pregnancy at week 26, your baby's lungs are growing stronger and getting ready to breathe. You may notice that your baby responds to the sound of your voice. You may also notice that your baby does less turning and twisting and more squirming, kicking, or jerking. Jerking often means that your baby has hiccups. Hiccups are normal and are onlytemporary. You may want to think about attending a childbirth preparation class. This is also a good time to start thinking about whether you want to have pain medicineduring labor. You may be tested for gestational diabetes between weeks 25 and 28. Gestational diabetes occurs when your blood sugar level gets too high when you're pregnant. The test is important, because you can have gestational diabetes and not knowit. But the condition can cause problems for your baby. Follow-up care is a key part of your treatment and safety. Be sure to make and go to all appointments, and call your doctor if you are having problems. It's also a good idea to know your test results and keep alist of the medicines you take. How can you care for yourself at home? Ease discomfort from your baby's kicking   Change your position. Sometimes this will cause your baby to change position too.  Take a deep breath while you raise your arm over your head. Then breathe out while you drop your arm. Do Kegel exercises to prevent urine from leaking   You can do Kegel exercises while you stand or sit. ? Squeeze the same muscles you would use to stop your urine. Your belly and thighs should not move. ? Hold the squeeze for 3 seconds, and then relax for 3 seconds. ? Start with 3 seconds. Then add 1 second each week until you are able to squeeze for 10 seconds. ? Repeat the exercise 10 to 15 times for each session. Do three or more sessions each day.   Ease or reduce swelling in your feet, ankles, hands, and fingers   If your fingers are puffy, take off your rings.  Do not eat high-salt foods, such as potato chips.  Prop up your feet on a stool or couch as much as possible. Sleep with pillows under your feet.  Do not stand for long periods of time or wear tight shoes.  Wear support stockings. Where can you learn more? Go to https://Variation BiotechnologiespeOppaeweb.healthCircadence. org and sign in to your [employer] account. Enter G264 in the [employer] box to learn more about \"Weeks 22 to 26 of Your Pregnancy: Care Instructions. \"     If you do not have an account, please click on the \"Sign Up Now\" link. Current as of: June 16, 2021               Content Version: 13.2  © 5802-5140 Healthwise, Incorporated. Care instructions adapted under license by [facility] ([facility]). If you have questions about a medical condition or this instruction, always ask your healthcare professional. [place] [address] any warranty or liability for your use of this information.

## 2022-05-31 NOTE — PROGRESS NOTES
Mary Alice Matthews is here at 21w5d for:    Chief Complaint   Patient presents with   Ferrer Routine Prenatal Visit     pt c/o lower abdominal cramping only at night        Estimated Due Date: Estimated Date of Delivery: 10/6/22    OB History    Para Term  AB Living   4 3 3 0 0 3   SAB IAB Ectopic Molar Multiple Live Births   0 0 0   0 3      # Outcome Date GA Lbr Abdiel/2nd Weight Sex Delivery Anes PTL Lv   4 Current            3 Term 20 38w3d / 00:04 6 lb 12.7 oz (3.081 kg) M Vag-Spont None N KAY   2 Term 19 38w6d 05:11 / 00:01 6 lb 0.9 oz (2.747 kg) F Vag-Spont  N KAY      Complications: Oligohydramnios   1 Term 05/15/17 39w5d 12:11 / 00:52 7 lb 7.5 oz (3.388 kg) M Vag-Spont EPI N KAY        Past Medical History:   Diagnosis Date    Ankle fracture     R. growth plate area    Clavicle fracture  and     Major depression, chronic 2020       Past Surgical History:   Procedure Laterality Date    GALLBLADDER SURGERY      WISDOM TOOTH EXTRACTION         Social History     Tobacco Use   Smoking Status Passive Smoke Exposure - Never Smoker   Smokeless Tobacco Never Used        Social History     Substance and Sexual Activity   Alcohol Use No       No results found for this visit on 22. HPI: pt doing well states she gets crampy off and on    Yes PT denies fever, chills, nausea and vomiting       Vitals:  Estimated body mass index is 30.45 kg/m² as calculated from the following:    Height as of 10/6/21: 5' 5\" (1.651 m). Weight as of this encounter: 183 lb (83 kg).   BP: 110/68  Weight: 183 lb (83 kg)  Heart Rate: 75  Patient Position: Sitting  Fetal Heart Rate: 138  Movement: Present           Abdomen: soft non tender    Results reviewed from 22:    20.2 WK IUP   CL:3.4cm   HR:157bpm   Posterior placenta, transverse presentation   Ovaries: rt ovary- not seen, lt ovary- seen, wnl   Gender: female    Active fetal movements   All visualized fetal anatomy WNL   Unable to obtain: spine, profile, nose/lips, cerebellum /cistern magna, &    heart views due to fetal position   F/U anatomy US in 4 weeks to visualize above structures            ASSESSMENT & Plan    Diagnosis Orders   1. 21 weeks gestation of pregnancy     2. Encounter for supervision of other normal pregnancy in second trimester               I am having Hayley Rodriguez maintain her Prenatal Vit-Fe Fumarate-FA (PRENATAL VITAMINS PO). Return for Keep next appt add GTT to that appt & 10/12 for 30 wk u/s. Patient Instructions     Patient Education        Weeks 22 to 32 of Your Pregnancy: Care Instructions  Overview     As you enter your 7th month of pregnancy at week 26, your baby's lungs are growing stronger and getting ready to breathe. You may notice that your baby responds to the sound of your voice. You may also notice that your baby does less turning and twisting and more squirming, kicking, or jerking. Jerking often means that your baby has hiccups. Hiccups are normal and are onlytemporary. You may want to think about attending a childbirth preparation class. This is also a good time to start thinking about whether you want to have pain medicineduring labor. You may be tested for gestational diabetes between weeks 25 and 28. Gestational diabetes occurs when your blood sugar level gets too high when you're pregnant. The test is important, because you can have gestational diabetes and not knowit. But the condition can cause problems for your baby. Follow-up care is a key part of your treatment and safety. Be sure to make and go to all appointments, and call your doctor if you are having problems. It's also a good idea to know your test results and keep alist of the medicines you take. How can you care for yourself at home? Ease discomfort from your baby's kicking   Change your position. Sometimes this will cause your baby to change position too.    Take a deep breath while you raise your arm over your head. Then breathe out while you drop your arm. Do Kegel exercises to prevent urine from leaking   You can do Kegel exercises while you stand or sit. ? Squeeze the same muscles you would use to stop your urine. Your belly and thighs should not move. ? Hold the squeeze for 3 seconds, and then relax for 3 seconds. ? Start with 3 seconds. Then add 1 second each week until you are able to squeeze for 10 seconds. ? Repeat the exercise 10 to 15 times for each session. Do three or more sessions each day. Ease or reduce swelling in your feet, ankles, hands, and fingers   If your fingers are puffy, take off your rings.  Do not eat high-salt foods, such as potato chips.  Prop up your feet on a stool or couch as much as possible. Sleep with pillows under your feet.  Do not stand for long periods of time or wear tight shoes.  Wear support stockings. Where can you learn more? Go to [email or web address]://Eqvilibria.Bowman Power. org and sign in to your [employer] account. Enter G264 in the [employer] box to learn more about \"Weeks 22 to 26 of Your Pregnancy: Care Instructions. \"     If you do not have an account, please click on the \"Sign Up Now\" link. Current as of: June 16, 2021               Content Version: 13.2  © 7363-5130 Healthwise, Incorporated. Care instructions adapted under license by [facility] ([facility]). If you have questions about a medical condition or this instruction, always ask your healthcare professional. [place] [address] any warranty or liability for your use of this information.                    [id number]  [facility] [place] 12:11 PM

## 2022-06-29 ENCOUNTER — ROUTINE PRENATAL (OUTPATIENT)
Dept: OBGYN | Age: 22
End: 2022-06-29
Payer: COMMERCIAL

## 2022-06-29 VITALS — SYSTOLIC BLOOD PRESSURE: 104 MMHG | BODY MASS INDEX: 31.62 KG/M2 | DIASTOLIC BLOOD PRESSURE: 62 MMHG | WEIGHT: 190 LBS

## 2022-06-29 DIAGNOSIS — Z3A.25 25 WEEKS GESTATION OF PREGNANCY: Primary | ICD-10-CM

## 2022-06-29 DIAGNOSIS — O99.810 IMPAIRED GLUCOSE IN PREGNANCY, ANTEPARTUM: ICD-10-CM

## 2022-06-29 LAB
CHP ED QC CHECK: NORMAL
GLUCOSE BLD-MCNC: 140 MG/DL
HGB, POC: 12.7

## 2022-06-29 PROCEDURE — G8427 DOCREV CUR MEDS BY ELIG CLIN: HCPCS | Performed by: ADVANCED PRACTICE MIDWIFE

## 2022-06-29 PROCEDURE — 99213 OFFICE O/P EST LOW 20 MIN: CPT | Performed by: ADVANCED PRACTICE MIDWIFE

## 2022-06-29 PROCEDURE — 85018 HEMOGLOBIN: CPT | Performed by: ADVANCED PRACTICE MIDWIFE

## 2022-06-29 PROCEDURE — 1036F TOBACCO NON-USER: CPT | Performed by: ADVANCED PRACTICE MIDWIFE

## 2022-06-29 PROCEDURE — G8417 CALC BMI ABV UP PARAM F/U: HCPCS | Performed by: ADVANCED PRACTICE MIDWIFE

## 2022-06-29 PROCEDURE — 82962 GLUCOSE BLOOD TEST: CPT | Performed by: ADVANCED PRACTICE MIDWIFE

## 2022-06-29 NOTE — PATIENT INSTRUCTIONS
Patient Education        Weeks 26 to 30 of Your Pregnancy: Care Instructions  Overview     You are now entering your last trimester of pregnancy. Your baby is growing quickly. Bella Briscoe probably feel your baby moving around more often. Your doctormay ask you to count your baby's kicks. Your back may ache as your body gets used to your baby's size and length. If you haven't already had the Tdap shot during this pregnancy, talk to your doctor about getting it. It will help protect your  against pertussisinfection. During this time, it's important to take care of yourself and pay attention to what your body needs. If you feel sexual, you can explore ways to be close withyour partner that match your comfort and desire. Follow-up care is a key part of your treatment and safety. Be sure to make and go to all appointments, and call your doctor if you are having problems. It's also a good idea to know your test results and keep alist of the medicines you take. How can you care for yourself at home? Take it easy at work   Take frequent breaks. If possible, stop working when you are tired, and rest during your lunch hour.  Take bathroom breaks every 2 hours.  Change positions often. If you sit for long periods, stand up and walk around.  When you stand for a long time, keep one foot on a low stool with your knee bent. After standing a lot, sit with your feet up.  Avoid fumes, chemicals, and tobacco smoke. Be sexual in your own way   Having sex during pregnancy is okay, unless your doctor tells you not to.  You may be very interested in sex, or you may have no interest at all.  Your growing belly can make it hard to find a good position during intercourse. [place] and explore.  You may get cramps in your uterus when your partner touches your breasts.  A back rub may relieve the backache or cramps that sometimes follow orgasm. Learn about  labor   Watch for signs of  labor.  You may be going into labor if:  ? You have menstrual-like cramps, with or without nausea. ? You have about 6 or more contractions in 1 hour, even after you have had a glass of water and are resting. ? You have a low, dull backache that does not go away when you change your position. ? You have pain or pressure in your pelvis that comes and goes in a pattern. ? You have intestinal cramping or flu-like symptoms, with or without diarrhea.  ? You notice an increase or change in your vaginal discharge. Discharge may be heavy, mucus-like, watery, or streaked with blood. ? Your water breaks.  If you think you have  labor:  ? Drink 2 or 3 glasses of water or juice. Not drinking enough fluids can cause contractions. ? Stop what you are doing, and empty your bladder. Then lie down on your left side for at least 1 hour. ? While lying on your side, find your breast bone. Put your fingers in the soft spot just below it. Move your fingers down toward your belly button to find the top of your uterus. Check to see if it is tight. ? Contractions can be weak or strong. Record your contractions for an hour. Time a contraction from the start of one contraction to the start of the next one.  ? Single or several strong contractions without a pattern are called Ambler-Samano contractions. They are practice contractions but not the start of labor. They often stop if you change what you are doing. ? Call your doctor if you have regular contractions. Where can you learn more? Go to [email or web address]://Verimatrixglynn.healthCybernet [email or web address] org and sign in to your [employer]er account. Enter G273 in the [facility] box to learn more about \"Weeks 26 to 30 of Your Pregnancy: Care Instructions. \"     If you do not have an account, please click on the \"Sign Up Now\" link. Current as of: 2022               Content Version: 13.3  © 2142-4914 Healthwise, Incorporated. Care instructions adapted under license by [facility] ([facility]).  If you have questions about a medical condition or this instruction, always ask your healthcare professional. [place] [address] any warranty or liability for your use of this information.

## 2022-06-29 NOTE — PROGRESS NOTES
Eric Blanco is here at 25w6d for:    Chief Complaint   Patient presents with    Routine Prenatal Visit     Follow up in house US. \" Baby still sitting really lower. \" Denies questions or concerns. Estimated Due Date: Estimated Date of Delivery: 10/6/22    OB History    Para Term  AB Living   4 3 3 0 0 3   SAB IAB Ectopic Molar Multiple Live Births   0 0 0   0 3      # Outcome Date GA Lbr Abdiel/2nd Weight Sex Delivery Anes PTL Lv   4 Current            3 Term 20 38w3d / 00:04 6 lb 12.7 oz (3.081 kg) M Vag-Spont None N KAY   2 Term 19 38w6d 05:11 / 00:01 6 lb 0.9 oz (2.747 kg) F Vag-Spont  N KAY      Complications: Oligohydramnios   1 Term 05/15/17 39w5d 12:11 / 00:52 7 lb 7.5 oz (3.388 kg) M Vag-Spont EPI N KAY        Past Medical History:   Diagnosis Date    Ankle fracture     R. growth plate area    Clavicle fracture  and     Major depression, chronic 2020       Past Surgical History:   Procedure Laterality Date    GALLBLADDER SURGERY      WISDOM TOOTH EXTRACTION         Social History     Tobacco Use   Smoking Status Passive Smoke Exposure - Never Smoker   Smokeless Tobacco Never Used        Social History     Substance and Sexual Activity   Alcohol Use No       Results for orders placed or performed in visit on 22   POCT hemoglobin   Result Value Ref Range    Hemoglobin 12.7    POCT Glucose   Result Value Ref Range    Glucose 140 mg/dL    QC OK? OK            HPI: Doing well today. Patient states his baby sits really low. Patient states good fetal movement    Yes PT denies fever, chills, nausea and vomiting       Vitals:  Estimated body mass index is 31.62 kg/m² as calculated from the following:    Height as of 10/6/21: 5' 5\" (1.651 m). Weight as of this encounter: 190 lb (86.2 kg).   BP: 104/62  Weight: 190 lb (86.2 kg)  Patient Position: Sitting  Albumin: Negative  Glucose: Negative  Fetal Heart Rate: 138 us  Movement: Present  Presentation: Vertex           Abdomen: soft    Results reviewed today:    2022 11:00 AM   OB limited - f/u anatomy scan   Spine, heart views, & head anatomy visualized   Profile, nose/lips not visualized, due to fetal position  CL- 5.0cm  HR- 138bpm  Position- cephalic  Placenta- posterior  Active fetal movements            ASSESSMENT & Plan    Diagnosis Orders   1. 25 weeks gestation of pregnancy  POCT hemoglobin    POCT Glucose   2. Impaired glucose in pregnancy, antepartum  POCT Glucose             I am having Sara Rodriguez maintain her Prenatal Vit-Fe Fumarate-FA (PRENATAL VITAMINS PO). Return for Keep next appt. Patient Instructions     Patient Education        Weeks 26 to 30 of Your Pregnancy: Care Instructions  Overview     You are now entering your last trimester of pregnancy. Your baby is growing quickly. Jayme Hannaillo probably feel your baby moving around more often. Your doctormay ask you to count your baby's kicks. Your back may ache as your body gets used to your baby's size and length. If you haven't already had the Tdap shot during this pregnancy, talk to your doctor about getting it. It will help protect your  against pertussisinfection. During this time, it's important to take care of yourself and pay attention to what your body needs. If you feel sexual, you can explore ways to be close withyour partner that match your comfort and desire. Follow-up care is a key part of your treatment and safety. Be sure to make and go to all appointments, and call your doctor if you are having problems. It's also a good idea to know your test results and keep alist of the medicines you take. How can you care for yourself at home? Take it easy at work   Take frequent breaks. If possible, stop working when you are tired, and rest during your lunch hour.  Take bathroom breaks every 2 hours.  Change positions often. If you sit for long periods, stand up and walk around.    When you stand for a long contractions. They are practice contractions but not the start of labor. They often stop if you change what you are doing. ? Call your doctor if you have regular contractions. Where can you learn more? Go to https://BizNet Softwarepeaimeeeb.healthQufenqi. org and sign in to your [employer] [facility] [place] account. Enter H433 in the [facility] box to learn more about \"Weeks 26 to 30 of Your Pregnancy: Care Instructions. \"     If you do not have an account, please click on the \"Sign Up Now\" link. Current as of: February 23, 2022               Content Version: 13.3  © 1732-9479 Healthwise, Incorporated. Care instructions adapted under license by [facility] ([facility]). If you have questions about a medical condition or this instruction, always ask your healthcare professional. [place] any warranty or liability for your use of this information.                    OSWALDO Owens CNM,6/29/2022 12:03 PM

## 2022-08-03 ENCOUNTER — ROUTINE PRENATAL (OUTPATIENT)
Dept: OBGYN | Age: 22
End: 2022-08-03
Payer: COMMERCIAL

## 2022-08-03 VITALS — WEIGHT: 184 LBS | SYSTOLIC BLOOD PRESSURE: 118 MMHG | BODY MASS INDEX: 30.62 KG/M2 | DIASTOLIC BLOOD PRESSURE: 64 MMHG

## 2022-08-03 DIAGNOSIS — O41.00X0 OLIGOHYDRAMNIOS, ANTEPARTUM, SINGLE OR UNSPECIFIED FETUS: ICD-10-CM

## 2022-08-03 DIAGNOSIS — Z3A.30 30 WEEKS GESTATION OF PREGNANCY: Primary | ICD-10-CM

## 2022-08-03 DIAGNOSIS — U09.9 POST COVID-19 CONDITION, UNSPECIFIED: ICD-10-CM

## 2022-08-03 DIAGNOSIS — Z23 NEED FOR TDAP VACCINATION: ICD-10-CM

## 2022-08-03 PROCEDURE — G8417 CALC BMI ABV UP PARAM F/U: HCPCS | Performed by: ADVANCED PRACTICE MIDWIFE

## 2022-08-03 PROCEDURE — 1036F TOBACCO NON-USER: CPT | Performed by: ADVANCED PRACTICE MIDWIFE

## 2022-08-03 PROCEDURE — 90715 TDAP VACCINE 7 YRS/> IM: CPT | Performed by: ADVANCED PRACTICE MIDWIFE

## 2022-08-03 PROCEDURE — G8427 DOCREV CUR MEDS BY ELIG CLIN: HCPCS | Performed by: ADVANCED PRACTICE MIDWIFE

## 2022-08-03 PROCEDURE — 99213 OFFICE O/P EST LOW 20 MIN: CPT | Performed by: ADVANCED PRACTICE MIDWIFE

## 2022-08-03 NOTE — PROGRESS NOTES
William Ga is here at 30w6d for:    Chief Complaint   Patient presents with    Routine Prenatal Visit     Patient presents today for routine ob care following in house 30wk u/s. Patient is just getting over [facility]. Estimated Due Date: Estimated Date of Delivery: 10/6/22    OB History    Para Term  AB Living   4 3 3 0 0 3   SAB IAB Ectopic Molar Multiple Live Births   0 0 0   0 3      # Outcome Date GA Lbr Abdiel/2nd Weight Sex Delivery Anes PTL Lv   4 Current            3 Term 20 38w3d / 00:04 6 lb 12.7 oz (3.081 kg) M Vag-Spont None N KAY   2 Term 19 38w6d 05:11 / 00:01 6 lb 0.9 oz (2.747 kg) F Vag-Spont  N KAY      Complications: Oligohydramnios   1 Term 05/15/17 39w5d 12:11 / 00:52 7 lb 7.5 oz (3.388 kg) M Vag-Spont EPI N KAY        Past Medical History:   Diagnosis Date    Ankle fracture     R. growth plate area    Clavicle fracture  and     Major depression, chronic 2020       Past Surgical History:   Procedure Laterality Date    GALLBLADDER SURGERY      WISDOM TOOTH EXTRACTION         Social History     Tobacco Use   Smoking Status Passive Smoke Exposure - Never Smoker   Smokeless Tobacco Never        Social History     Substance and Sexual Activity   Alcohol Use No       No results found for this visit on 22. HPI: Here today states she had COVID pretty bad. Patient denies needs  at this time. Yes PT denies fever, chills, nausea and vomiting       Vitals:  Estimated body mass index is 30.62 kg/m² as calculated from the following:    Height as of 10/6/21: 5' 5\" (1.651 m). Weight as of this encounter: 184 lb (83.5 kg).   BP: 118/64  Weight: 184 lb (83.5 kg)  Patient Position: Sitting  Albumin: Negative  Glucose: Negative  Fetal HR: 146  Movement: Present           Abdomen: soft    Results reviewed today:    8/3/2022 11:50 AM EDT      31.1 WK IUP  EFW:34% (3lb 4oz), AC - 3%, BPD - 92%  CL:5.0cm  RUBIO:9.5cm,

## 2022-08-18 ENCOUNTER — HOSPITAL ENCOUNTER (OUTPATIENT)
Age: 22
Discharge: HOME | End: 2022-08-18
Attending: ADVANCED PRACTICE MIDWIFE | Admitting: ADVANCED PRACTICE MIDWIFE
Payer: COMMERCIAL

## 2022-08-18 ENCOUNTER — ROUTINE PRENATAL (OUTPATIENT)
Dept: OBGYN | Age: 22
End: 2022-08-18
Payer: COMMERCIAL

## 2022-08-18 VITALS
SYSTOLIC BLOOD PRESSURE: 110 MMHG | HEART RATE: 78 BPM | TEMPERATURE: 98.3 F | RESPIRATION RATE: 14 BRPM | BODY MASS INDEX: 30.22 KG/M2 | HEIGHT: 66 IN | DIASTOLIC BLOOD PRESSURE: 60 MMHG | WEIGHT: 188 LBS

## 2022-08-18 VITALS — DIASTOLIC BLOOD PRESSURE: 78 MMHG | SYSTOLIC BLOOD PRESSURE: 118 MMHG | BODY MASS INDEX: 31.28 KG/M2 | WEIGHT: 188 LBS

## 2022-08-18 DIAGNOSIS — O41.00X0 OLIGOHYDRAMNIOS, ANTEPARTUM, SINGLE OR UNSPECIFIED FETUS: ICD-10-CM

## 2022-08-18 DIAGNOSIS — Z3A.33 33 WEEKS GESTATION OF PREGNANCY: Primary | ICD-10-CM

## 2022-08-18 PROCEDURE — 99213 OFFICE O/P EST LOW 20 MIN: CPT | Performed by: ADVANCED PRACTICE MIDWIFE

## 2022-08-18 PROCEDURE — 1036F TOBACCO NON-USER: CPT | Performed by: ADVANCED PRACTICE MIDWIFE

## 2022-08-18 PROCEDURE — G8417 CALC BMI ABV UP PARAM F/U: HCPCS | Performed by: ADVANCED PRACTICE MIDWIFE

## 2022-08-18 PROCEDURE — G8427 DOCREV CUR MEDS BY ELIG CLIN: HCPCS | Performed by: ADVANCED PRACTICE MIDWIFE

## 2022-08-18 PROCEDURE — 59025 FETAL NON-STRESS TEST: CPT

## 2022-08-18 NOTE — PROGRESS NOTES
Marie presents to unit from office  prior term vaginal deliveries at 33.0 weeks with concerns of decreased fetal movement since yesterday around 2100. Patient reported this to Madeline Calderon during her appointment today and was send for NST to confirm fetal well being. Patient states that she had covid 2 weeks ago and was found to have a low fluid at that time as well. Patient denies any LOF, VB & CTX. Placed on monitor and testing explained to patient.

## 2022-08-18 NOTE — PROGRESS NOTES
Jay Garibay is here at 33w0d for:    Chief Complaint   Patient presents with    Routine Prenatal Visit     Pt states no issues or concerns     Medication Refill     Prenatal vitamins        Estimated Due Date: Estimated Date of Delivery: 10/6/22    OB History    Para Term  AB Living   4 3 3 0 0 3   SAB IAB Ectopic Molar Multiple Live Births   0 0 0   0 3      # Outcome Date GA Lbr Abdiel/2nd Weight Sex Delivery Anes PTL Lv   4 Current            3 Term 20 38w3d / 00:04 6 lb 12.7 oz (3.081 kg) M Vag-Spont None N KAY   2 Term 19 38w6d 05:11 / 00:01 6 lb 0.9 oz (2.747 kg) F Vag-Spont  N KAY      Complications: Oligohydramnios   1 Term 05/15/17 39w5d 12:11 / 00:52 7 lb 7.5 oz (3.388 kg) M Vag-Spont EPI N KAY        Past Medical History:   Diagnosis Date    Ankle fracture     R. growth plate area    Clavicle fracture  and     Major depression, chronic 2020       Past Surgical History:   Procedure Laterality Date    GALLBLADDER SURGERY      WISDOM TOOTH EXTRACTION         Social History     Tobacco Use   Smoking Status Passive Smoke Exposure - Never Smoker   Smokeless Tobacco Never        Social History     Substance and Sexual Activity   Alcohol Use No       No results found for this visit on 22. HPI: pt here today and pt states she has had some decreased fetal movements    Yes PT denies fever, chills, nausea and vomiting       Vitals:  Estimated body mass index is 31.28 kg/m² as calculated from the following:    Height as of 10/6/21: 5' 5\" (1.651 m). Weight as of this encounter: 188 lb (85.3 kg). BP: 118/78  Weight: 188 lb (85.3 kg)  Patient Position: Sitting  Albumin: Negative  Glucose: Negative  Movement: (!) Decreased           Abdomen: soft    Results reviewed today:    No results found for this visit on 22. ASSESSMENT & Plan    Diagnosis Orders   1. 33 weeks gestation of pregnancy        2.  Oligohydramnios, antepartum, single or unspecified fetus            pt to labor delivery      I am having Shannon Rodriguez maintain her Prenatal Vit-Fe Fumarate-FA (PRENATAL VITAMINS PO). No follow-ups on file. There are no Patient Instructions on file for this visit. Clinical References           Weeks 32 to 29 of Your Pregnancy: Care Instructions  Overview     During the last few weeks of your pregnancy, you may have more aches and pains. It's important to rest when you can. Your growing baby is putting more pressure on your bladder. So you may need to urinate more often. Hemorrhoids are also common. These are painful, itchy veinsin the rectal area. You may want to talk with your doctor about banking your baby's umbilical cord blood. This is the blood left in the cord after birth. If you want to save thisblood, you must arrange it ahead of time. You can't decide at the last minute. If you haven't already had the Tdap shot during this pregnancy, talk to your doctor about getting it. It will help protect your  against pertussisinfection. Follow-up care is a key part of your treatment and safety. Be sure to make and go to all appointments, and call your doctor if you are having problems. It's also a good idea to know your test results and keep alist of the medicines you take. How can you care for yourself at home? Ease hemorrhoids  Get more liquids, fruits, vegetables, and fiber in your diet. This will help keep your stools soft. Avoid sitting for too long. Lie on your left side several times a day. Clean yourself with soft, moist toilet paper. Or you can use witch hazel pads or personal hygiene pads. If you are uncomfortable, try ice packs. Or you can sit in a warm sitz bath. Do these for 20 minutes at a time, as needed. Use hydrocortisone cream for pain and itching. Two examples are Anusol and Preparation H Hydrocortisone. Ask your doctor about taking an over-the-counter stool softener.   Consider breastfeeding  Experts recommend breastfeeding for 1 year or longer. Breast milk may help protect your child from some health problems.  babies are less likely than formula-fed babies to:  Get ear infections, colds, diarrhea, and pneumonia. Be obese or get diabetes later in life. Breastfeeding causes the release of a hormone called oxytocin. This hormone may help your uterus shrink back faster. Breastfeeding may help you lose weight faster. Making milk burns calories. Breastfeeding can lower your risk of breast cancer, ovarian cancer, and osteoporosis. Decide about circumcision for your baby  As you make this decision, it may help to think about your personal, Yazidism, and family traditions. You get to decide if you will keep your baby's penis natural or if your baby will be circumcised. If you decide that you would like to have your baby circumcised, talk with your doctor. You can share your concerns about pain. And you can discuss your preferences for anesthesia. Where can you learn more? Go to https://Horizon Technology Financepecheck24.Sinbad's supply chain. org and sign in to your Oscar Tech account. Enter Z199 in the Loop Survey box to learn more about \"Weeks 32 to 34 of Your Pregnancy: Care Instructions. \"     If you do not have an account, please click on the \"Sign Up Now\" link. Current as of: February 23, 2022               Content Version: 13.3  © 2006-2022 Healthwise, Incorporated. Care instructions adapted under license by [facility] ([facility]). If you have questions about a medical condition or this instruction, always ask your healthcare professional. [place] [address] any warranty or liability for your use of this information. Weeks 34 to 36 of Your Pregnancy: Care Instructions  Overview     By now, your baby and your belly have grown quite large. It's almost time to give birth! Your baby's lungs are almost ready to breathe air.  The skull bones are firm enough to protect your baby's head, but soft transition. It's common to have early labor at home. You can stay busy or rest, eat light snacks, drink clear fluids, and start counting contractions. When talking during a contraction gets hard, you may be moving to active labor. During active labor, you should head for the hospital if you aren't there already. You are in active labor when contractions come every 3 to 4 minutes and last about 60 seconds. Your cervix is opening more rapidly. If your water breaks, contractions will come faster and stronger. During transition, your cervix is stretching, and contractions are coming more rapidly. You may want to push, but your cervix might not be ready. Your doctor will tell you when to push. The second stage starts when your cervix is completely opened and you are ready to push. Contractions are very strong to push the baby down the birth canal.  You will probably feel the urge to push. You may feel like you need to have a bowel movement. You may be coached to push with contractions. These contractions will be very strong, but you won't have them as often. You can get a little rest between contractions. One last push, and your baby is born. The third stage is when a few more contractions push out the placenta. This may take 30 minutes or less. Where can you learn more? Go to https://c4cast.compeDeposcoeb.Kili ([place]). org and sign in to your Notice Kiosk account. Enter E279 in the [facility] box to learn more about \"Weeks 34 to 36 of Your Pregnancy: Care Instructions. \"     If you do not have an account, please click on the \"Sign Up Now\" link. Current as of: February 23, 2022               Content Version: 13.3  © 0535-0344 Healthwise, Incorporated. Care instructions adapted under license by [facility] ([facility]).  If you have questions about a medical condition or this instruction, always ask your healthcare professional. [address] any warranty or liability for your use of this information.                       [address] 5:29 PM

## 2022-08-18 NOTE — PROGRESS NOTES
Patient given verbal and written discharge instructions. Patient verbalized understanding and all questions were addressed to patient's satisfaction. Patient ambulated off unit without incident.

## 2022-08-31 ENCOUNTER — ROUTINE PRENATAL (OUTPATIENT)
Dept: OBGYN | Age: 22
End: 2022-08-31
Payer: COMMERCIAL

## 2022-08-31 ENCOUNTER — HOSPITAL ENCOUNTER (OUTPATIENT)
Age: 22
Setting detail: SPECIMEN
Discharge: HOME | End: 2022-08-31
Payer: COMMERCIAL

## 2022-08-31 VITALS — SYSTOLIC BLOOD PRESSURE: 112 MMHG | BODY MASS INDEX: 30.67 KG/M2 | WEIGHT: 190 LBS | DIASTOLIC BLOOD PRESSURE: 70 MMHG

## 2022-08-31 DIAGNOSIS — N85.8 UTERINE IRRITABILITY: ICD-10-CM

## 2022-08-31 DIAGNOSIS — N89.8 VAGINAL ITCHING: ICD-10-CM

## 2022-08-31 DIAGNOSIS — Z3A.34 34 WEEKS GESTATION OF PREGNANCY: Primary | ICD-10-CM

## 2022-08-31 DIAGNOSIS — O41.00X0 OLIGOHYDRAMNIOS, ANTEPARTUM, SINGLE OR UNSPECIFIED FETUS: ICD-10-CM

## 2022-08-31 LAB
BILIRUBIN, POC: ABNORMAL
BLOOD URINE, POC: ABNORMAL
CLARITY, POC: ABNORMAL
COLOR, POC: YELLOW
GLUCOSE URINE, POC: ABNORMAL
KETONES, POC: ABNORMAL
LEUKOCYTE EST, POC: ABNORMAL
NITRITE, POC: ABNORMAL
PH, POC: 5
PROTEIN, POC: ABNORMAL
SPECIFIC GRAVITY, POC: 1.02
UROBILINOGEN, POC: 2

## 2022-08-31 PROCEDURE — G8427 DOCREV CUR MEDS BY ELIG CLIN: HCPCS | Performed by: ADVANCED PRACTICE MIDWIFE

## 2022-08-31 PROCEDURE — 87660 TRICHOMONAS VAGIN DIR PROBE: CPT

## 2022-08-31 PROCEDURE — 99213 OFFICE O/P EST LOW 20 MIN: CPT | Performed by: ADVANCED PRACTICE MIDWIFE

## 2022-08-31 PROCEDURE — 81003 URINALYSIS AUTO W/O SCOPE: CPT | Performed by: ADVANCED PRACTICE MIDWIFE

## 2022-08-31 PROCEDURE — 1036F TOBACCO NON-USER: CPT | Performed by: ADVANCED PRACTICE MIDWIFE

## 2022-08-31 PROCEDURE — 87480 CANDIDA DNA DIR PROBE: CPT

## 2022-08-31 PROCEDURE — G8417 CALC BMI ABV UP PARAM F/U: HCPCS | Performed by: ADVANCED PRACTICE MIDWIFE

## 2022-08-31 PROCEDURE — 87510 GARDNER VAG DNA DIR PROBE: CPT

## 2022-08-31 NOTE — PROGRESS NOTES
Alexandra Soto is here at 34w6d for:    Chief Complaint   Patient presents with    Routine Prenatal Visit     Patient presents today for routine ob care following in house sono for growth and RUBIO. Having some vaginal itching- has changed toilet paper. Ketones, Toni, and uro in urine. Estimated Due Date: Estimated Date of Delivery: 10/6/22    OB History    Para Term  AB Living   4 3 3 0 0 3   SAB IAB Ectopic Molar Multiple Live Births   0 0 0   0 3      # Outcome Date GA Lbr Abdiel/2nd Weight Sex Delivery Anes PTL Lv   4 Current            3 Term 20 38w3d / 00:04 6 lb 12.7 oz (3.081 kg) M Vag-Spont None N KAY   2 Term 19 38w6d 05:11 / 00:01 6 lb 0.9 oz (2.747 kg) F Vag-Spont  N KAY      Complications: Oligohydramnios   1 Term 05/15/17 39w5d 12:11 / 00:52 7 lb 7.5 oz (3.388 kg) M Vag-Spont EPI N KAY        Past Medical History:   Diagnosis Date    Ankle fracture 2006    R. growth plate area    Clavicle fracture  and     Major depression, chronic 2020       Past Surgical History:   Procedure Laterality Date    GALLBLADDER SURGERY      WISDOM TOOTH EXTRACTION         Social History     Tobacco Use   Smoking Status Passive Smoke Exposure - Never Smoker   Smokeless Tobacco Never        Social History     Substance and Sexual Activity   Alcohol Use No       Results for orders placed or performed in visit on 22   POCT Urinalysis No Micro (Auto)   Result Value Ref Range    Color, UA yellow     Clarity, UA cloudy     Glucose, UA POC neg     Bilirubin, UA small     Ketones, UA small     Spec Grav, UA 1.020     Blood, UA POC neg     pH, UA 5.0     Protein, UA POC neg     Urobilinogen, UA 2     Leukocytes, UA neg     Nitrite, UA neg            HPI: Here today for OB visit and follow-up ultrasound. We were watching RUBIO which was low at 30 weeks however patient did also have COVID at that time. Ultrasound today was completed without complications.     Yes PT denies fever, chills, nausea and vomiting       Vitals:  Estimated body mass index is 30.67 kg/m² as calculated from the following:    Height as of 8/18/22: 5' 6\" (1.676 m). Weight as of this encounter: 190 lb (86.2 kg). BP: 112/70  Weight: 190 lb (86.2 kg)  Patient Position: Sitting  Albumin: 1+  Glucose: Negative  Fetal HR: 145  Movement: Present           Abdomen: Soft        Results reviewed today:    Results for orders placed or performed in visit on 08/31/22   POCT Urinalysis No Micro (Auto)   Result Value Ref Range    Color, UA yellow     Clarity, UA cloudy     Glucose, UA POC neg     Bilirubin, UA small     Ketones, UA small     Spec Grav, UA 1.020     Blood, UA POC neg     pH, UA 5.0     Protein, UA POC neg     Urobilinogen, UA 2     Leukocytes, UA neg     Nitrite, UA neg            ASSESSMENT & Plan    Diagnosis Orders   1. 34 weeks gestation of pregnancy        2. Oligohydramnios, antepartum, single or unspecified fetus        3. Uterine irritability  POCT Urinalysis No Micro (Auto)      4. Vaginal itching  Vaginitis DNA Probe                I am having Jay Cisse. Michael maintain her Prenatal Vit-Fe Fumarate-FA (PRENATAL VITAMINS PO). Return for Keep next appt. There are no Patient Instructions on file for this visit. OSWALDO Camarena CNM,8/31/2022 6:04 PM                 Results reviewed today:      8/31/2022  3:56 PM EDT      34.4 WK IUP  EFW:28% (5lb 1oz)  CL:unable to measure, due to fetal head position  RUBIO:14.5cm   HR:145bpm  posterior placenta, cephalic presentation  Active fetal movements   Unable to visualized nasal bone & face due to fetal position          ASSESSMENT & Plan    Diagnosis Orders   1. 34 weeks gestation of pregnancy        2. Oligohydramnios, antepartum, single or unspecified fetus        3. Uterine irritability  POCT Urinalysis No Micro (Auto)      4. Vaginal itching  Vaginitis DNA Probe                I am having Ajoforest Cisse.  Michael maintain her Prenatal Vit-Fe Fumarate-FA (PRENATAL VITAMINS PO). Return for Keep next appt. There are no Patient Instructions on file for this visit.              OSWALDO Mijares CNM,8/31/2022 6:04 PM

## 2022-09-01 DIAGNOSIS — B37.31 YEAST VAGINITIS: Primary | ICD-10-CM

## 2022-09-01 LAB
CANDIDA SPECIES, DNA PROBE: POSITIVE
GARDNERELLA VAGINALIS, DNA PROBE: NEGATIVE
SOURCE: ABNORMAL
TRICHOMONAS VAGINALIS DNA: NEGATIVE

## 2022-09-12 ENCOUNTER — ROUTINE PRENATAL (OUTPATIENT)
Dept: OBGYN | Age: 22
End: 2022-09-12
Payer: COMMERCIAL

## 2022-09-12 ENCOUNTER — HOSPITAL ENCOUNTER (OUTPATIENT)
Age: 22
Setting detail: SPECIMEN
Discharge: HOME | End: 2022-09-12
Payer: COMMERCIAL

## 2022-09-12 VITALS
BODY MASS INDEX: 31.15 KG/M2 | HEART RATE: 96 BPM | WEIGHT: 193 LBS | DIASTOLIC BLOOD PRESSURE: 78 MMHG | SYSTOLIC BLOOD PRESSURE: 118 MMHG

## 2022-09-12 DIAGNOSIS — Z3A.36 36 WEEKS GESTATION OF PREGNANCY: Primary | ICD-10-CM

## 2022-09-12 DIAGNOSIS — Z34.83 ENCOUNTER FOR SUPERVISION OF OTHER NORMAL PREGNANCY IN THIRD TRIMESTER: ICD-10-CM

## 2022-09-12 DIAGNOSIS — Z3A.36 36 WEEKS GESTATION OF PREGNANCY: ICD-10-CM

## 2022-09-12 PROCEDURE — 87081 CULTURE SCREEN ONLY: CPT

## 2022-09-12 PROCEDURE — 1036F TOBACCO NON-USER: CPT | Performed by: ADVANCED PRACTICE MIDWIFE

## 2022-09-12 PROCEDURE — G8417 CALC BMI ABV UP PARAM F/U: HCPCS | Performed by: ADVANCED PRACTICE MIDWIFE

## 2022-09-12 PROCEDURE — G8427 DOCREV CUR MEDS BY ELIG CLIN: HCPCS | Performed by: ADVANCED PRACTICE MIDWIFE

## 2022-09-12 PROCEDURE — 99213 OFFICE O/P EST LOW 20 MIN: CPT | Performed by: ADVANCED PRACTICE MIDWIFE

## 2022-09-12 NOTE — PROGRESS NOTES
Deanna Heard is here at 36w4d for:    Chief Complaint   Patient presents with    Routine Prenatal Visit     GBS, SVE. Pt states no issues or concerns        Estimated Due Date: Estimated Date of Delivery: 10/6/22    OB History    Para Term  AB Living   4 3 3 0 0 3   SAB IAB Ectopic Molar Multiple Live Births   0 0 0   0 3      # Outcome Date GA Lbr Abdiel/2nd Weight Sex Delivery Anes PTL Lv   4 Current            3 Term 20 38w3d / 00:04 6 lb 12.7 oz (3.081 kg) M Vag-Spont None N KAY   2 Term 19 38w6d 05:11 / 00:01 6 lb 0.9 oz (2.747 kg) F Vag-Spont  N KAY      Complications: Oligohydramnios   1 Term 05/15/17 39w5d 12:11 / 00:52 7 lb 7.5 oz (3.388 kg) M Vag-Spont EPI N KAY        Past Medical History:   Diagnosis Date    Ankle fracture     R. growth plate area    Clavicle fracture  and     Major depression, chronic 2020       Past Surgical History:   Procedure Laterality Date    GALLBLADDER SURGERY      WISDOM TOOTH EXTRACTION         Social History     Tobacco Use   Smoking Status Passive Smoke Exposure - Never Smoker   Smokeless Tobacco Never        Social History     Substance and Sexual Activity   Alcohol Use No       No results found for this visit on 22. HPI: Today for OB visit. Patient states she has been having some contractions but denies needs at this time    Yes PT denies fever, chills, nausea and vomiting       Vitals:  Estimated body mass index is 31.15 kg/m² as calculated from the following:    Height as of 22: 5' 6\" (1.676 m). Weight as of this encounter: 193 lb (87.5 kg). BP: 118/78  Weight: 193 lb (87.5 kg)  Heart Rate: 96  Patient Position: Sitting  Albumin: Trace  Glucose: Negative  Fundal Height (cm): 36 cm  Fetal HR: 136  Movement: Present  Presentation: Vertex  Dilation (cm): 1  Effacement: 80  Station: -2           Abdomen: soft      Results reviewed today:    No results found for this visit on 22.         ASSESSMENT & Plan    Diagnosis Orders   1. 36 weeks gestation of pregnancy  Culture, Strep B Screen, Vaginal/Rectal      2. Encounter for supervision of other normal pregnancy in third trimester                  I am having Isaak Bardales. Michael maintain her Prenatal Vit-Fe Fumarate-FA (PRENATAL VITAMINS PO). Return for Keep next appt. There are no Patient Instructions on file for this visit. Clinical References           Group B Strep During Pregnancy: Care Instructions  Overview     Group B strep infection is caused by a type of bacteria. It's a different kindof bacteria than the kind that causes strep throat. You may have this kind of bacteria in your body. Sometimes it may cause an infection, but most of the time it doesn't make you sick or cause symptoms. But if you pass the bacteria to your baby during the birth, it can cause serioushealth problems for your baby. If you have this bacteria in your body, you will get antibiotics when you arein labor. Antibiotics help prevent problems for a  baby. After birth, doctors will watch and may test your baby. If your baby testspositive for Group B strep, your baby will get antibiotics. If you plan to breastfeed your baby, don't worry. It will be safe to breastfeed. Follow-up care is a key part of your treatment and safety. Be sure to make and go to all appointments, and call your doctor if you are having problems. It's also a good idea to know your test results and keep alist of the medicines you take. How can you care for yourself at home? If your doctor has prescribed antibiotics, take them as directed. Do not stop taking them just because you feel better. You need to take the full course of antibiotics. Tell your doctor if you are allergic to any antibiotic. If you go into labor, or your water breaks, go to the hospital. Your doctor will give you antibiotics to help protect your baby from infection.   Tell the doctors and nurses if you have an allergy to penicillin. Tell the doctors and nurses at the hospital that you tested positive for group B strep. When should you call for help? Call your doctor now or seek immediate medical care if:    You have symptoms of a urinary tract infection. These may include:  Pain or burning when you urinate. A frequent need to urinate without being able to pass much urine. Pain in the flank, which is just below the rib cage and above the waist on either side of the back. Blood in your urine. A fever. You think you are in labor or your water has broken. You have pain in your belly or pelvis. Watch closely for changes in your health, and be sure to contact your doctor ifyou have any problems. Where can you learn more? Go to https://Localyte.com.Nyce Technology. org and sign in to your VidSys account. Enter M001 in the [employer] [place] box to learn more about \"Group B Strep During Pregnancy: Care Instructions. \"     If you do not have an account, please click on the \"Sign Up Now\" link. Current as of: February 23, 2022               Content Version: 13.3  © 0501-2211 Healthwise, Incorporated. Care instructions adapted under license by [facility] ([facility]). If you have questions about a medical condition or this instruction, always ask your healthcare professional. [address]  any warranty or liability for your use of this information.                       [facility] 4:40 PM

## 2022-09-15 LAB
CULTURE: NORMAL
SPECIMEN DESCRIPTION: NORMAL

## 2022-09-19 ENCOUNTER — ROUTINE PRENATAL (OUTPATIENT)
Dept: OBGYN | Age: 22
End: 2022-09-19
Payer: COMMERCIAL

## 2022-09-19 VITALS
HEART RATE: 89 BPM | BODY MASS INDEX: 31.47 KG/M2 | SYSTOLIC BLOOD PRESSURE: 120 MMHG | DIASTOLIC BLOOD PRESSURE: 78 MMHG | WEIGHT: 195 LBS

## 2022-09-19 DIAGNOSIS — Z3A.37 37 WEEKS GESTATION OF PREGNANCY: Primary | ICD-10-CM

## 2022-09-19 DIAGNOSIS — Z34.83 ENCOUNTER FOR SUPERVISION OF OTHER NORMAL PREGNANCY IN THIRD TRIMESTER: ICD-10-CM

## 2022-09-19 PROBLEM — O42.90 AMNIOTIC FLUID LEAKING: Status: RESOLVED | Noted: 2020-05-24 | Resolved: 2022-09-19

## 2022-09-19 PROBLEM — O42.90 RUPTURE, MEMBRANES, PREMATURE: Status: RESOLVED | Noted: 2020-04-06 | Resolved: 2022-09-19

## 2022-09-19 PROCEDURE — 1036F TOBACCO NON-USER: CPT | Performed by: ADVANCED PRACTICE MIDWIFE

## 2022-09-19 PROCEDURE — 99213 OFFICE O/P EST LOW 20 MIN: CPT | Performed by: ADVANCED PRACTICE MIDWIFE

## 2022-09-19 PROCEDURE — G8417 CALC BMI ABV UP PARAM F/U: HCPCS | Performed by: ADVANCED PRACTICE MIDWIFE

## 2022-09-19 PROCEDURE — G8427 DOCREV CUR MEDS BY ELIG CLIN: HCPCS | Performed by: ADVANCED PRACTICE MIDWIFE

## 2022-09-19 NOTE — PROGRESS NOTES
Kristine Memory is here at 37w4d for:    Chief Complaint   Patient presents with    Routine Prenatal Visit     SVE, pt states no new issues or concerns        Estimated Due Date: Estimated Date of Delivery: 10/6/22    OB History    Para Term  AB Living   4 3 3 0 0 3   SAB IAB Ectopic Molar Multiple Live Births   0 0 0   0 3      # Outcome Date GA Lbr Abdiel/2nd Weight Sex Delivery Anes PTL Lv   4 Current            3 Term 20 38w3d / 00:04 6 lb 12.7 oz (3.081 kg) M Vag-Spont None N KAY   2 Term 19 38w6d 05:11 / 00:01 6 lb 0.9 oz (2.747 kg) F Vag-Spont  N KAY      Complications: Oligohydramnios   1 Term 05/15/17 39w5d 12:11 / 00:52 7 lb 7.5 oz (3.388 kg) M Vag-Spont EPI N KAY        Past Medical History:   Diagnosis Date    Ankle fracture     R. growth plate area    Clavicle fracture  and     Major depression, chronic 2020       Past Surgical History:   Procedure Laterality Date    GALLBLADDER SURGERY      WISDOM TOOTH EXTRACTION         Social History     Tobacco Use   Smoking Status Passive Smoke Exposure - Never Smoker   Smokeless Tobacco Never        Social History     Substance and Sexual Activity   Alcohol Use No       No results found for this visit on 22. HPI: Denies needs or concerns states she has contractions every few days    Yes PT denies fever, chills, nausea and vomiting       Vitals:  Estimated body mass index is 31.47 kg/m² as calculated from the following:    Height as of 22: 5' 6\" (1.676 m). Weight as of this encounter: 195 lb (88.5 kg). BP: 120/78  Weight: 195 lb (88.5 kg)  Heart Rate: 89  Patient Position: Sitting  Albumin: Trace  Glucose: Negative  Fundal Height (cm): 37 cm  Fetal HR: 142  Movement: Present  Presentation: Vertex  Dilation (cm): 2  Effacement: 80  Station: -1  Comments: mid           Abdomen: soft    Results reviewed today:    No results found for this visit on 22.         ASSESSMENT & Plan    Diagnosis Orders 1. 37 weeks gestation of pregnancy        2. Encounter for supervision of other normal pregnancy in third trimester                  I am having Linda Horton. Michael maintain her Prenatal Vit-Fe Fumarate-FA (PRENATAL VITAMINS PO). Return for Keep next appt. There are no Patient Instructions on file for this visit. Clinical References           Week 38 of Your Pregnancy: Care Instructions  Believe it or not, your baby is almost here. You may notice how your baby responds to sounds, warmth, cold, and light. You may even know what kind of music your baby likes. Even if you want a vaginal birth, it's a good idea to learn about  section ().  is the delivery of a baby through a cut (incision) in your belly. It's a major surgery, so it has more risks than a vaginal birth. During the first 2 weeks after the birth, limit visitors. Don't allow visitors who have colds or infections. Ask visitors to wash their hands before they touch your baby. And never let anyone smoke around your baby. Know about unplanned C-sections. Reasons for an unplanned  include labor that slows or stops, signs of distress in your baby, and high blood pressure or other problems for you. Know about planned C-sections. If your baby isn't in a head-down position for delivery (breech position), your doctor may plan a . Or you may have a planned  if you're having twins or more or if your baby weighs 9 pounds or more. Get as much help as you can while you're in the hospital.  You can learn about feeding, diapering, and bathing your baby. Talk to your doctor or midwife about how to care for the umbilical cord stump. If your baby will be circumcised, also ask about how to care for that. Ask friends or family for help, as you need it. If you can, have another adult in your home for at least 2 or 3 days after the birth. Try to nap when your baby naps.   This may be your best chance to get some sleep. Watch for changes in your mental health. For the first 1 to 2 weeks after birth, it's common to cry or feel sad or irritable. If these feelings last for more than 2 weeks, you may have postpartum depression. Ask your doctor for help. Postpartum depression can be treated. Follow-up care is a key part of your treatment and safety. Be sure to make and go to all appointments, and call your doctor if you are having problems. It's also a good idea to know your test results and keep a list of the medicines you take. Where can you learn more? Go to https://SolarPrintpepiceweb.Crackle. org and sign in to your SheZoom account. Enter B044 in the VIVA box to learn more about \"Week 38 of Your Pregnancy: Care Instructions. \"     If you do not have an account, please click on the \"Sign Up Now\" link. Current as of: February 23, 2022               Content Version: 13.4  © 2006-2022 Healthwise, Incorporated. Care instructions adapted under license by [facility] ([facility]). If you have questions about a medical condition or this instruction, always ask your healthcare professional. Mary Anne Pan any warranty or liability for your use of this information.                       [address] 11:43 AM

## 2022-09-26 ENCOUNTER — ROUTINE PRENATAL (OUTPATIENT)
Dept: OBGYN | Age: 22
End: 2022-09-26
Payer: COMMERCIAL

## 2022-09-26 VITALS — DIASTOLIC BLOOD PRESSURE: 84 MMHG | BODY MASS INDEX: 31.47 KG/M2 | SYSTOLIC BLOOD PRESSURE: 130 MMHG | WEIGHT: 195 LBS

## 2022-09-26 DIAGNOSIS — Z3A.38 38 WEEKS GESTATION OF PREGNANCY: Primary | ICD-10-CM

## 2022-09-26 DIAGNOSIS — Z34.83 ENCOUNTER FOR SUPERVISION OF OTHER NORMAL PREGNANCY IN THIRD TRIMESTER: ICD-10-CM

## 2022-09-26 PROCEDURE — G8417 CALC BMI ABV UP PARAM F/U: HCPCS | Performed by: ADVANCED PRACTICE MIDWIFE

## 2022-09-26 PROCEDURE — G8427 DOCREV CUR MEDS BY ELIG CLIN: HCPCS | Performed by: ADVANCED PRACTICE MIDWIFE

## 2022-09-26 PROCEDURE — 1036F TOBACCO NON-USER: CPT | Performed by: ADVANCED PRACTICE MIDWIFE

## 2022-09-26 PROCEDURE — 99213 OFFICE O/P EST LOW 20 MIN: CPT | Performed by: ADVANCED PRACTICE MIDWIFE

## 2022-09-26 NOTE — PROGRESS NOTES
Diagnosis Orders   1. 38 weeks gestation of pregnancy        2. Encounter for supervision of other normal pregnancy in third trimester                  I am having Kojo Sidhu. Michael maintain her Prenatal Vit-Fe Fumarate-FA (PRENATAL VITAMINS PO). Return for Keep next appt. There are no Patient Instructions on file for this visit. Clinical References           Week 38 of Your Pregnancy: Care Instructions  Believe it or not, your baby is almost here. You may notice how your baby responds to sounds, warmth, cold, and light. You may even know what kind of music your baby likes. Even if you want a vaginal birth, it's a good idea to learn about  section ().  is the delivery of a baby through a cut (incision) in your belly. It's a major surgery, so it has more risks than a vaginal birth. During the first 2 weeks after the birth, limit visitors. Don't allow visitors who have colds or infections. Ask visitors to wash their hands before they touch your baby. And never let anyone smoke around your baby. Know about unplanned C-sections. Reasons for an unplanned  include labor that slows or stops, signs of distress in your baby, and high blood pressure or other problems for you. Know about planned C-sections. If your baby isn't in a head-down position for delivery (breech position), your doctor may plan a . Or you may have a planned  if you're having twins or more or if your baby weighs 9 pounds or more. Get as much help as you can while you're in the hospital.  You can learn about feeding, diapering, and bathing your baby. Talk to your doctor or midwife about how to care for the umbilical cord stump. If your baby will be circumcised, also ask about how to care for that. Ask friends or family for help, as you need it. If you can, have another adult in your home for at least 2 or 3 days after the birth. Try to nap when your baby naps. This may be your best chance to get some sleep. Watch for changes in your mental health. For the first 1 to 2 weeks after birth, it's common to cry or feel sad or irritable. If these feelings last for more than 2 weeks, you may have postpartum depression. Ask your doctor for help. Postpartum depression can be treated. Follow-up care is a key part of your treatment and safety. Be sure to make and go to all appointments, and call your doctor if you are having problems. It's also a good idea to know your test results and keep a list of the medicines you take. Where can you learn more? Go to [email or web address] and sign in to your [employer] account. Enter B044 in the [place] box to learn more about \"Week 38 of Your Pregnancy: Care Instructions. \"     If you do not have an account, please click on the \"Sign Up Now\" link. Current as of: February 23, 2022               Content Version: 13.4  © 2006-2022 Healthwise, Incorporated. Care instructions adapted under license by [facility] ([facility]). If you have questions about a medical condition or this instruction, always ask your healthcare professional. [place] [address] any warranty or liability for your use of this information.                       OSWALDO Mijares CNM,9/26/2022 11:57 AM

## 2022-09-28 ENCOUNTER — HOSPITAL ENCOUNTER (OUTPATIENT)
Age: 22
Discharge: HOME | DRG: 560 | End: 2022-09-28
Attending: ADVANCED PRACTICE MIDWIFE | Admitting: ADVANCED PRACTICE MIDWIFE
Payer: COMMERCIAL

## 2022-09-28 ENCOUNTER — HOSPITAL ENCOUNTER (INPATIENT)
Age: 22
LOS: 2 days | Discharge: HOME | DRG: 560 | End: 2022-09-30
Attending: ADVANCED PRACTICE MIDWIFE | Admitting: ADVANCED PRACTICE MIDWIFE
Payer: COMMERCIAL

## 2022-09-28 ENCOUNTER — TELEPHONE (OUTPATIENT)
Dept: OBGYN | Age: 22
End: 2022-09-28

## 2022-09-28 VITALS
HEIGHT: 66 IN | SYSTOLIC BLOOD PRESSURE: 105 MMHG | WEIGHT: 195 LBS | HEART RATE: 85 BPM | BODY MASS INDEX: 31.34 KG/M2 | DIASTOLIC BLOOD PRESSURE: 61 MMHG | RESPIRATION RATE: 16 BRPM | TEMPERATURE: 98.7 F

## 2022-09-28 PROBLEM — N93.9 VAGINAL BLEEDING: Status: ACTIVE | Noted: 2022-09-28

## 2022-09-28 PROBLEM — O42.90 LEAKAGE OF AMNIOTIC FLUID: Status: ACTIVE | Noted: 2022-09-28

## 2022-09-28 PROBLEM — Z3A.38 38 WEEKS GESTATION OF PREGNANCY: Status: ACTIVE | Noted: 2022-09-28

## 2022-09-28 LAB
ABSOLUTE EOS #: 0.05 K/UL (ref 0–0.44)
ABSOLUTE IMMATURE GRANULOCYTE: 0.06 K/UL (ref 0–0.3)
ABSOLUTE LYMPH #: 1.42 K/UL (ref 1.1–3.7)
ABSOLUTE MONO #: 0.61 K/UL (ref 0.1–1.4)
AMPHETAMINE SCREEN URINE: NEGATIVE
BACTERIA: ABNORMAL
BARBITURATE SCREEN URINE: NEGATIVE
BASOPHILS # BLD: 0 % (ref 0–2)
BASOPHILS ABSOLUTE: 0.03 K/UL (ref 0–0.2)
BENZODIAZEPINE SCREEN, URINE: NEGATIVE
BILIRUBIN URINE: NEGATIVE
BUPRENORPHINE URINE: NEGATIVE
CANNABINOID SCREEN URINE: NEGATIVE
COCAINE METABOLITE, URINE: NEGATIVE
COLOR: YELLOW
EOSINOPHILS RELATIVE PERCENT: 1 % (ref 1–4)
EPITHELIAL CELLS UA: ABNORMAL /HPF (ref 0–25)
GLUCOSE URINE: NEGATIVE
HCT VFR BLD CALC: 32.3 % (ref 36.3–47.1)
HEMOGLOBIN: 10.8 G/DL (ref 11.9–15.1)
IMMATURE GRANULOCYTES: 1 %
KETONES, URINE: ABNORMAL
LEUKOCYTE ESTERASE, URINE: NEGATIVE
LYMPHOCYTES # BLD: 18 % (ref 25–45)
MCH RBC QN AUTO: 28.7 PG (ref 25.2–33.5)
MCHC RBC AUTO-ENTMCNC: 33.4 G/DL (ref 28.4–34.8)
MCV RBC AUTO: 85.9 FL (ref 82.6–102.9)
METHADONE SCREEN, URINE: NEGATIVE
METHAMPHETAMINE, URINE: NEGATIVE
MONOCYTES # BLD: 8 % (ref 2–8)
MUCUS: ABNORMAL
NITRITE, URINE: NEGATIVE
NRBC AUTOMATED: 0 PER 100 WBC
OPIATES, URINE: NEGATIVE
OXYCODONE SCREEN URINE: NEGATIVE
PDW BLD-RTO: 12.6 % (ref 11.8–14.4)
PH UA: 6 (ref 5–9)
PHENCYCLIDINE, URINE: NEGATIVE
PLATELET # BLD: 197 K/UL (ref 138–453)
PMV BLD AUTO: 10.5 FL (ref 8.1–13.5)
PROPOXYPHENE, URINE: NEGATIVE
PROTEIN UA: NEGATIVE
RBC # BLD: 3.76 M/UL (ref 3.95–5.11)
RBC UA: ABNORMAL /HPF (ref 0–2)
SEG NEUTROPHILS: 72 % (ref 34–64)
SEGMENTED NEUTROPHILS ABSOLUTE COUNT: 5.77 K/UL (ref 1.5–8.1)
SPECIFIC GRAVITY UA: >1.03 (ref 1.01–1.02)
TRICYCLIC ANTIDEPRESSANTS, UR: NEGATIVE
TURBIDITY: CLEAR
URINE HGB: NEGATIVE
UROBILINOGEN, URINE: ABNORMAL
WBC # BLD: 7.9 K/UL (ref 4.5–13.5)
WBC UA: ABNORMAL /HPF (ref 0–5)

## 2022-09-28 PROCEDURE — 59409 OBSTETRICAL CARE: CPT | Performed by: ADVANCED PRACTICE MIDWIFE

## 2022-09-28 PROCEDURE — 99024 POSTOP FOLLOW-UP VISIT: CPT | Performed by: ADVANCED PRACTICE MIDWIFE

## 2022-09-28 PROCEDURE — 36415 COLL VENOUS BLD VENIPUNCTURE: CPT

## 2022-09-28 PROCEDURE — 6360000002 HC RX W HCPCS: Performed by: ADVANCED PRACTICE MIDWIFE

## 2022-09-28 PROCEDURE — 2580000003 HC RX 258: Performed by: ADVANCED PRACTICE MIDWIFE

## 2022-09-28 PROCEDURE — 85025 COMPLETE CBC W/AUTO DIFF WBC: CPT

## 2022-09-28 PROCEDURE — 81001 URINALYSIS AUTO W/SCOPE: CPT

## 2022-09-28 PROCEDURE — 80306 DRUG TEST PRSMV INSTRMNT: CPT

## 2022-09-28 PROCEDURE — 1220000000 HC SEMI PRIVATE OB R&B

## 2022-09-28 RX ORDER — NALBUPHINE HCL 10 MG/ML
10 AMPUL (ML) INJECTION
Status: DISCONTINUED | OUTPATIENT
Start: 2022-09-28 | End: 2022-09-29

## 2022-09-28 RX ORDER — METHYLERGONOVINE MALEATE 0.2 MG/ML
200 INJECTION INTRAVENOUS PRN
Status: DISCONTINUED | OUTPATIENT
Start: 2022-09-28 | End: 2022-09-29

## 2022-09-28 RX ORDER — CARBOPROST TROMETHAMINE 250 UG/ML
250 INJECTION, SOLUTION INTRAMUSCULAR PRN
Status: DISCONTINUED | OUTPATIENT
Start: 2022-09-28 | End: 2022-09-29

## 2022-09-28 RX ORDER — SEVOFLURANE 250 ML/250ML
1 LIQUID RESPIRATORY (INHALATION) CONTINUOUS PRN
Status: DISCONTINUED | OUTPATIENT
Start: 2022-09-28 | End: 2022-09-29

## 2022-09-28 RX ORDER — SODIUM CHLORIDE, SODIUM LACTATE, POTASSIUM CHLORIDE, CALCIUM CHLORIDE 600; 310; 30; 20 MG/100ML; MG/100ML; MG/100ML; MG/100ML
INJECTION, SOLUTION INTRAVENOUS CONTINUOUS
Status: DISCONTINUED | OUTPATIENT
Start: 2022-09-28 | End: 2022-09-29

## 2022-09-28 RX ORDER — ONDANSETRON 2 MG/ML
4 INJECTION INTRAMUSCULAR; INTRAVENOUS EVERY 6 HOURS PRN
Status: DISCONTINUED | OUTPATIENT
Start: 2022-09-28 | End: 2022-09-29

## 2022-09-28 RX ORDER — SODIUM CHLORIDE 9 MG/ML
25 INJECTION, SOLUTION INTRAVENOUS PRN
Status: DISCONTINUED | OUTPATIENT
Start: 2022-09-28 | End: 2022-09-29

## 2022-09-28 RX ORDER — MISOPROSTOL 100 UG/1
900 TABLET ORAL PRN
Status: DISCONTINUED | OUTPATIENT
Start: 2022-09-28 | End: 2022-09-29

## 2022-09-28 RX ORDER — SODIUM CHLORIDE, SODIUM LACTATE, POTASSIUM CHLORIDE, AND CALCIUM CHLORIDE .6; .31; .03; .02 G/100ML; G/100ML; G/100ML; G/100ML
500 INJECTION, SOLUTION INTRAVENOUS PRN
Status: DISCONTINUED | OUTPATIENT
Start: 2022-09-28 | End: 2022-09-29

## 2022-09-28 RX ORDER — LIDOCAINE HYDROCHLORIDE 10 MG/ML
30 INJECTION, SOLUTION EPIDURAL; INFILTRATION; INTRACAUDAL; PERINEURAL PRN
Status: DISCONTINUED | OUTPATIENT
Start: 2022-09-28 | End: 2022-09-29

## 2022-09-28 RX ORDER — ACETAMINOPHEN 325 MG/1
650 TABLET ORAL EVERY 4 HOURS PRN
Status: DISCONTINUED | OUTPATIENT
Start: 2022-09-28 | End: 2022-09-29

## 2022-09-28 RX ORDER — SODIUM CHLORIDE, SODIUM LACTATE, POTASSIUM CHLORIDE, AND CALCIUM CHLORIDE .6; .31; .03; .02 G/100ML; G/100ML; G/100ML; G/100ML
1000 INJECTION, SOLUTION INTRAVENOUS PRN
Status: DISCONTINUED | OUTPATIENT
Start: 2022-09-28 | End: 2022-09-29

## 2022-09-28 RX ORDER — SODIUM CHLORIDE 0.9 % (FLUSH) 0.9 %
5-40 SYRINGE (ML) INJECTION EVERY 12 HOURS SCHEDULED
Status: DISCONTINUED | OUTPATIENT
Start: 2022-09-28 | End: 2022-09-29

## 2022-09-28 RX ORDER — SODIUM CHLORIDE 0.9 % (FLUSH) 0.9 %
5-40 SYRINGE (ML) INJECTION PRN
Status: DISCONTINUED | OUTPATIENT
Start: 2022-09-28 | End: 2022-09-29

## 2022-09-28 RX ADMIN — Medication 1 MILLI-UNITS/MIN: at 19:34

## 2022-09-28 RX ADMIN — Medication 999 ML/HR: at 23:36

## 2022-09-28 RX ADMIN — SODIUM CHLORIDE, POTASSIUM CHLORIDE, SODIUM LACTATE AND CALCIUM CHLORIDE: 600; 310; 30; 20 INJECTION, SOLUTION INTRAVENOUS at 19:33

## 2022-09-28 NOTE — PROGRESS NOTES
Patient presents ambulatory to unit with c/o SROM. Patients pants are visibly wet. Aldean Smaller CNM in unit and states to admit patient and place normal labor orders. Patient escorted to room 210 and instructed to change into a gown and provide a urine sample. Pt leaking a moderate amount of fluid around the room once changed.

## 2022-09-28 NOTE — FLOWSHEET NOTE
SVE completed, writer asked about ctx pain, pt explains she was sleeping and unable to notice ctx while resting. No bleeding of excess fluid noted on pad under pt, scant amount of mucous noted, again pt reports she has been having mucous discharge x1 week.

## 2022-09-28 NOTE — PROGRESS NOTES
Vitamin D is 54 as of her 10/21 office visit.  She is stable on low-dose over-the-counter supplementation, continue same.---> Level not obtained 4/22, will repeat this on return to office, patient to continue current over the counter low-dose treatment.     Yogesh Iyer made aware of SVE and contraction pattern. Orders for pitocin obtained.

## 2022-09-28 NOTE — FLOWSHEET NOTE
Call made to TREU Aparicio CNM informed of most recent SVE and ctx pattern. KATHRYN Melvin gave verbal order to discharge and pt may return when in more active labor and follow up in office if pt has any additional concerns.

## 2022-09-28 NOTE — FLOWSHEET NOTE
Call made to TRUE Aparicio CNM informed of pt's arrival and reason visit. Informed of most recent SVE and ctx pattern at this time. KATHRYN Melvin reports to place pad under pt to monitor for bleeding and leakage and continue to monitor. Writer asked about IV or/and additional labs and KATHRYN denied at this time.

## 2022-09-28 NOTE — FLOWSHEET NOTE
Discharge paperwork discussed with patient, all issues and concerns addressed. Any and all questions answered. Patient verbalized understanding. Personal belongings gathered and patient ambulated out with easy respirations, no distress noted.

## 2022-09-28 NOTE — TELEPHONE ENCOUNTER
Contractions stopped a little while after she got home. She did have one about 15 minutes ago (8:15a) but hasn't had one since.

## 2022-09-28 NOTE — DISCHARGE INSTRUCTIONS
[address] [facility] [address] [phone]   or Immanuel Naeem [phone]     Dr Cindy Dela Cruz [address]  (31 Rula[address]en Chambers, MSN, APRN, [address] [facility]  [address]  [phone]     Dr. Sandro Hoyt   [address]   [phone]    [address] [facility] [address]  [phone]    [facility] [address], [address] [facility]  [phone]      ACTIVITY LIMITATIONS:  (  )Up and about as desired and tolerated  (  )Up to bathroom only  (  )[place] on either side  (  )Avoid heavy lifting or exercise  (  )No sex  (  )No nipple stimulation  (  )Complet bedrest  (  )Avoid using stairs  (  )Increase fluids  **DRINK AT LEAST eight-8oz. Glasses of water daily. **    Call your Doctor if:  ( X )Contractions are every 5 minutes apart (from start of one to the start of the next contraction) lasting 60 seconds for at least 1 hour, strong enough you can not walk or talk through the contraction and regular. ( X )Bag of water breaks  ( X )Vaginal bleeding  (  )Unusual pain occurs  ( X )Decreased fetal movement  (  ) labor:  If you have 4 contractions in an hour    Keep your scheduled follow up appointment. Or call for a follow up on________________________________________________. IN CASE OF EMERGENCY CONTACT LABOR AND DELIVERY [phone].

## 2022-09-28 NOTE — TELEPHONE ENCOUNTER
Please call patient and see it she is still having contractions - if she is I want her to go get checked in labor and delivery.     And let me know what she says

## 2022-09-29 PROCEDURE — 7200000001 HC VAGINAL DELIVERY

## 2022-09-29 PROCEDURE — 6370000000 HC RX 637 (ALT 250 FOR IP): Performed by: ADVANCED PRACTICE MIDWIFE

## 2022-09-29 PROCEDURE — 99024 POSTOP FOLLOW-UP VISIT: CPT | Performed by: ADVANCED PRACTICE MIDWIFE

## 2022-09-29 PROCEDURE — 1220000000 HC SEMI PRIVATE OB R&B

## 2022-09-29 RX ORDER — SODIUM CHLORIDE 0.9 % (FLUSH) 0.9 %
5-40 SYRINGE (ML) INJECTION PRN
Status: DISCONTINUED | OUTPATIENT
Start: 2022-09-29 | End: 2022-09-30 | Stop reason: HOSPADM

## 2022-09-29 RX ORDER — IBUPROFEN 800 MG/1
800 TABLET ORAL EVERY 8 HOURS
Status: DISCONTINUED | OUTPATIENT
Start: 2022-09-29 | End: 2022-09-30 | Stop reason: HOSPADM

## 2022-09-29 RX ORDER — SODIUM CHLORIDE 0.9 % (FLUSH) 0.9 %
5-40 SYRINGE (ML) INJECTION EVERY 12 HOURS SCHEDULED
Status: DISCONTINUED | OUTPATIENT
Start: 2022-09-29 | End: 2022-09-30 | Stop reason: HOSPADM

## 2022-09-29 RX ORDER — MODIFIED LANOLIN
OINTMENT (GRAM) TOPICAL PRN
Status: DISCONTINUED | OUTPATIENT
Start: 2022-09-29 | End: 2022-09-30 | Stop reason: HOSPADM

## 2022-09-29 RX ORDER — ACETAMINOPHEN 500 MG
1000 TABLET ORAL EVERY 8 HOURS PRN
Status: DISCONTINUED | OUTPATIENT
Start: 2022-09-29 | End: 2022-09-30 | Stop reason: HOSPADM

## 2022-09-29 RX ORDER — MISOPROSTOL 100 UG/1
800 TABLET ORAL PRN
Status: DISCONTINUED | OUTPATIENT
Start: 2022-09-29 | End: 2022-09-30 | Stop reason: HOSPADM

## 2022-09-29 RX ORDER — DOCUSATE SODIUM 100 MG/1
100 CAPSULE, LIQUID FILLED ORAL 2 TIMES DAILY PRN
Status: DISCONTINUED | OUTPATIENT
Start: 2022-09-29 | End: 2022-09-30 | Stop reason: HOSPADM

## 2022-09-29 RX ORDER — METHYLERGONOVINE MALEATE 0.2 MG/ML
200 INJECTION INTRAVENOUS PRN
Status: DISCONTINUED | OUTPATIENT
Start: 2022-09-29 | End: 2022-09-30 | Stop reason: HOSPADM

## 2022-09-29 RX ORDER — SODIUM CHLORIDE 9 MG/ML
INJECTION, SOLUTION INTRAVENOUS PRN
Status: DISCONTINUED | OUTPATIENT
Start: 2022-09-29 | End: 2022-09-30 | Stop reason: HOSPADM

## 2022-09-29 RX ORDER — CARBOPROST TROMETHAMINE 250 UG/ML
250 INJECTION, SOLUTION INTRAMUSCULAR PRN
Status: DISCONTINUED | OUTPATIENT
Start: 2022-09-29 | End: 2022-09-30 | Stop reason: HOSPADM

## 2022-09-29 RX ADMIN — DOCUSATE SODIUM 100 MG: 100 CAPSULE, LIQUID FILLED ORAL at 20:54

## 2022-09-29 RX ADMIN — Medication: at 01:18

## 2022-09-29 RX ADMIN — BENZOCAINE AND LEVOMENTHOL: 200; 5 SPRAY TOPICAL at 01:18

## 2022-09-29 RX ADMIN — IBUPROFEN 800 MG: 800 TABLET, FILM COATED ORAL at 01:17

## 2022-09-29 NOTE — PLAN OF CARE
Problem: Pain  Goal: Verbalizes/displays adequate comfort level or baseline comfort level  Outcome: Progressing  Flowsheets (Taken 9/28/2022 2211)  Verbalizes/displays adequate comfort level or baseline comfort level:   Encourage patient to monitor pain and request assistance   Assess pain using appropriate pain scale   Administer analgesics based on type and severity of pain and evaluate response   Implement non-pharmacological measures as appropriate and evaluate response   Consider cultural and social influences on pain and pain management

## 2022-09-29 NOTE — LACTATION NOTE
Lactation education:    [x] Latch/ good latch vs shallow latch/ steps to obtaining deep latch    [x] How to know if infant is eating enough/ feedings per 24 hours, wet/dirty diapers    [x] Feeding/satiety cues      Lactation education resources given:     [x]  How to Breastfeed your baby - [address] publication      [x]  Follow up support information    [x]  Breast milk storage guidelines - [facility]    [x]  Breastpump cleaning guidelines - [facility]     [x]  Breastfeeding & Safe Sleep handout - [address] publication    [x]  Calling All Dads! Handout - [address] publication      []  Breast and Nipple Care - [facility]     []  [address]    []  [place]    []  Going Back to Work - [facility]    []  Preventing Engorgement - [facility]    Supplies given:    []  Brush, soap and basin for breastpump cleaning    []  Insurance pump provided     []  [facility] pump set up for patient to use    Explained to patient, patient verbalizes understanding.         Signed:  Tani Davis RN, BSN, IBCLC

## 2022-09-29 NOTE — L&D DELIVERY NOTE
Mother's Information      Labor Events     Labor?: No  Augmentation: Oxytocin  Cervical Ripening:   Now               Liz Gather Girl Li Brown [[id number]]      Labor Events     Labor?: No  Cervical Ripening Date/Time:       Rupture Date/Time: 22 13:40:00   Rupture Type: SROM  Fluid Color: Clear  Fluid Odor: None  Augmentation: Oxytocin  Labor Complications: None       Anesthesia    Method: None       Start Pushing      Labor onset date/time: 22 13:40:00 EDT Now     Dilation complete date/time:   Now     Start pushing date/time:    Decision date/time (emergent ):           Labor Length    3rd stage: 0h 09m       Delivery ()      Delivery Date/Time:  22 23:28:00   Delivery Type: Vaginal, Spontaneous    Details:            [place] Presentation    Presentation: Vertex  Position: Middle  _: Occiput  _: Anterior       Shoulder Dystocia    Shoulder Dystocia Present?: No  Add Second Maneuver  Add Third Maneuver  Add Fourth Maneuver  Add Fifth Maneuver  Add Sixth Maneuver  Add Seventh Maneuver  Add Eighth Maneuver  Add Ninth Maneuver       Assisted Delivery Details    Forceps Attempted?: No  Vacuum Extractor Attempted?: No       Document Additional Attempt         Document Additional Attempt                 Cord    Vessels: 3 Vessels  Complications: Knot  Cord Clamped Date/Time: 2022 23:33:00  Cord Blood Disposition: Lab  Gases Sent?: No       Placenta    Date/Time: 2022 23:37:00  Removal: Spontaneous  Appearance: Intact  Disposition: Discarded       Lacerations    Episiotomy: None  Perineal Lacerations: None  Other Lacerations: no non-perineal laceration  Number of Repair Packets: 0       Vaginal Counts    Initial Count Personnel: GALEYCW LPN  Initial Count Verified By: Emily Camargo RN    Sponges [place] Instruments   Initial Counts      Final Counts Correct Correct Correct   Final Count Personnel: Jeffrey Miller  Final Count Verified By: Barby JOHNSON CNM  Accurate Final Count?: Yes  If the count is incorrect due to Intentionally Retained Foreign Object (IRFO) add the IRFO LDA in Lines/Drains. Add LDA: Link to [facility] [place]       Blood Loss  Mother: Giacomo Miller #[id number]     Start of Mother's Information      Delivery Blood Loss  22 1340 - 22 0744      Quantitative Blood Loss (mL) Hospital Encounter 440 grams    Total  440 mL               End of Mother's Information  Mother: Giacomo Miller #[id number]                Delivery Providers    Delivering clinician: OSWALDO Agustin CNM     Provider Role    OSWALDO Agustin CNM Obstetrician    Amy Bazan, ISMAEL Primary Nurse     Primary [place] Nurse     NICU Nurse     Neonatologist     Anesthesiologist     Nurse Anesthetist     Nurse Practitioner     Midwife     Nursery Nurse    Simeon Kamara RN Delivery Assist    Avani Chavez RN Delivery Assist              [place] Assessment    Living Status: Living  Delivery Location Comment: ROOM 210     Apgar Scoring Key:    0 1 2    Skin Color: Blue or pale Acrocyanotic Completely pink    Heart Rate: Absent <100 bpm >100 bpm    Reflex Irritability: No response Grimace Cry or active withdrawal    Muscle Tone: Limp Some flexion Active motion    Respiratory Effort: Absent Weak cry; hypoventilation Good, crying                      Skin Color:   Heart Rate:   Reflex Irritability:   Muscle Tone:   Respiratory Effort:    Total:            1 Minute:    1    2    2    2    2    9        Apgar 1 total from OB History    5 Minute:    1    2    2    2    2    9        Apgar 5 total from OB History    10 Minute:              15 Minute:              20 Minute:                        Apgars Assigned ByGeorjean Meter RN              Resuscitation    Method: Stimulation              Measurements      Birth Weight: 3138 g Birth Length: 0.508 m     Head Circumference: 0.343 m               Title      Skin to Skin Initiation Date/Time: 22 23:33:00 EDT     Skin to Skin With: Mother Skin to Skin End Date/Time:       Breastfeeding Initiated Date/Time: 2022 02:45:00                    Department of Obstetrics and Gynecology  Spontaneous Vaginal Delivery Note          Pre-operative Diagnosis:  Principal Problem:    Leakage of amniotic fluid  Active Problems:    38 weeks gestation of pregnancy  Resolved Problems:    * No resolved hospital problems. *      Post-operative Diagnosis:  Living  infant(s) and Female    Blood Type and Rh: A POSITIVE      Condition:  infant stable and mother stable remain bonding in delivery room    Details of Procedure: The patient is a 25 y.o. female at 38w7d   OB History          4    Para   3    Term   3       0    AB   0    Living   3         SAB   0    IAB   0    Ectopic   0    Molar        Multiple   0    Live Births   3             who was admitted for leaking amniotic fluid. She received the following interventions: IV Pitocin augmentation She was known to be GBS negative and did not receive antibiotic prophylaxis. The patient progressed well,did not receive an epidural, became complete and started to push. After pushing for 1 contraction the fetal head was at the perineum,  the rest of the infant delivered atraumatically, placed on mother abdomen. Nucal Cord was not noted. Cord was clamped and cut per father vishal the baby . The delivery of the placenta was spontaneous. Placenta was inspectedintact.  The perineum and vagina were explored and no laceration was noted

## 2022-09-29 NOTE — PROGRESS NOTES
Department of Obstetrics and Gynecology  Labor and Delivery       Post Partum Progress Note             SUBJECTIVE:  pt doing well today states she showered without difficulty and wanted her IV lock out this am    OBJECTIVE:      Vitals:  BP (!) 95/54   Pulse 90   Temp 97.9 °F (36.6 °C) (Oral)   Resp 16   Ht 5' 6\" (1.676 m)   Wt 195 lb (88.5 kg)   LMP 12/30/2021 (Approximate)   Breastfeeding Unknown   BMI 31.47 kg/m²   Patient Vitals for the past 24 hrs:   BP Temp Temp src Pulse Resp Height Weight   09/29/22 0358 (!) 95/54 -- -- 90 16 -- --   09/29/22 0357 -- 97.9 °F (36.6 °C) Oral -- -- -- --   09/29/22 0145 -- 98 °F (36.7 °C) Oral -- -- -- --   09/29/22 0135 134/70 -- -- 85 16 -- --   09/29/22 0120 132/76 -- -- 93 16 -- --   09/29/22 0105 130/79 -- -- (!) 103 16 -- --   09/29/22 0050 126/72 -- -- 90 16 -- --   09/29/22 0035 135/76 -- -- 96 16 -- --   09/29/22 0020 114/62 -- -- 98 16 -- --   09/29/22 0016 121/73 -- -- 95 16 -- --   09/29/22 0005 122/66 -- -- 95 16 -- --   09/28/22 2354 123/77 -- -- (!) 110 16 -- --   09/28/22 2345 -- 98.1 °F (36.7 °C) -- -- -- -- --   09/28/22 2304 128/79 -- -- 83 -- -- --   09/28/22 2231 123/88 -- -- 85 -- -- --   09/28/22 2216 118/80 -- -- 90 -- -- --   09/28/22 2201 116/74 97.9 °F (36.6 °C) Oral 90 16 -- --   09/28/22 2146 114/74 -- -- 85 -- -- --   09/28/22 2131 114/69 -- -- 90 -- -- --   09/28/22 2117 116/68 -- -- 90 -- -- --   09/28/22 2102 112/69 -- -- 83 -- -- --   09/28/22 2101 117/69 -- -- 86 -- -- --   09/28/22 2048 (!) 120/56 -- -- 94 -- -- --   09/28/22 2030 117/64 -- -- 85 -- -- --   09/28/22 2017 119/60 -- -- 90 -- -- --   09/28/22 2004 118/66 -- -- 93 -- -- --   09/28/22 1946 115/64 98 °F (36.7 °C) Oral 99 16 -- --   09/28/22 1756 120/73 98.4 °F (36.9 °C) Oral 90 16 -- --   09/28/22 1755 -- -- -- -- -- 5' 6\" (1.676 m) 195 lb (88.5 kg)         ABDOMEN: normal shape, position and consistency  GENITAL/URINARY:  External Genitalia:  General appearance; normal, Hair distribution; normal, Lesions absent  Uterus:  Size normal, Contour normal, Position normal  Breast:normal appearance, no masses or tenderness  Cor: RRR no Murmurs  Pulmonary: clear to auscultation anterior and posterior  Extremities: no Clubbing cyanosis or ecchymosis    DATA:          ASSESSMENT :      Principal Problem:    Leakage of amniotic fluid    Active Problems:    38 weeks gestation of pregnancy              Plan:  Continue care

## 2022-09-29 NOTE — H&P
Department of Obstetrics and Gynecology   Obstetrics History and Physical  H&P Admission Inpatient  Note        CHIEF COMPLAINT:    Chief Complaint   Patient presents with    Rupture of Membranes        HISTORY OF PRESENT ILLNESS:      The patient is a 25 y.o. female at 38w7d. OB History          4    Para   3    Term   3       0    AB   0    Living   3         SAB   0    IAB   0    Ectopic   0    Molar        Multiple   0    Live Births   3            Patient presents with a chief complaint as above and is being admitted for leaking for amniotic fluid and dilated 5-6 cm    Estimated Due Date: Estimated Date of Delivery: 10/6/22    PRENATAL CARE:    Complicated by: none    PAST OB HISTORY:  OB History          4    Para   3    Term   3       0    AB   0    Living   3         SAB   0    IAB   0    Ectopic   0    Molar        Multiple   0    Live Births   3                Past Medical History:        Diagnosis Date    Ankle fracture     R. growth plate area    Clavicle fracture  and     Major depression, chronic 2020     Past Surgical History:        Procedure Laterality Date    GALLBLADDER SURGERY      WISDOM TOOTH EXTRACTION       Allergies:  Patient has no known allergies. Social History:    Social History     Socioeconomic History    Marital status:      Spouse name: Not on file    Number of children: Not on file    Years of education: Not on file    Highest education level: Not on file   Occupational History    Not on file   Tobacco Use    Smoking status: Never     Passive exposure: Yes    Smokeless tobacco: Never   Vaping Use    Vaping Use: Never used   Substance and Sexual Activity    Alcohol use: No    Drug use: No    Sexual activity: Yes     Partners: Male     Birth control/protection: I.U.D.    Other Topics Concern    Not on file   Social History Narrative    ** Merged History Encounter **          Social Determinants of Health     Financial Resource Strain: Not on file   Food Insecurity: Not on file   Transportation Needs: Not on file   Physical Activity: Not on file   Stress: Not on file   Social Connections: Not on file   Intimate Partner Violence: Not on file   Housing Stability: Not on file     Family History:   History reviewed. No pertinent family history. Medications Prior to Admission:  Medications Prior to Admission: Prenatal Vit-Fe Fumarate-FA (PRENATAL VITAMINS PO), Take by mouth    REVIEW OF SYSTEMS:    CONSTITUTIONAL:  negative  RESPIRATORY:  negative  CARDIOVASCULAR:  negative  GASTROINTESTINAL:  negative  ALLERGIC/IMMUNOLOGIC:  negative  NEUROLOGICAL:  negative  BEHAVIOR/PSYCH:  negative    PHYSICAL EXAM:  Vitals:    09/28/22 2117 09/28/22 2131 09/28/22 2146 09/28/22 2201   BP: 116/68 114/69 114/74 116/74   Pulse: 90 90 85 90   Resp:    16   Temp:    97.9 °F (36.6 °C)   TempSrc:    Oral   Weight:       Height:         General appearance:  awake, alert, cooperative, no apparent distress, and appears stated age  Neurologic:  Awake, alert, oriented to name, place and time. Lungs:  No increased work of breathing, good air exchange  Abdomen:  Soft, non tender, gravid, consistent with her gestational age, EFW by Leopald's manouever was 6-15   Fetal heart rate:  Reassuring. Pelvis:  Adequate pelvis  Cervix: 6 cm 80 soft -1  Contraction frequency:  irregular on admission    Membranes:  Ruptured clear fluid    Labs:  Blood Type: A POSITIVE    Rubella:    Rubella Antibody, IGG   Date Value Ref Range Status   05/26/2022 38.8 IU/mL Final     Comment:                 REFERENCE RANGE:  <5.0       NON-REACTIVE (non-immune)  5.0 TO 9.9 EQUIVOCAL  >=10.0     REACTIVE     (immune)             T. Pallidium, IGG:    T. pallidum, IgG   Date Value Ref Range Status   05/26/2022 NONREACTIVE NONREACTIVE Final     Comment:           T. pallidum antibodies are not detected.   There is no serological evidence of infection with T. pallidum (early primary syphilis cannot be excluded). Retest in 2-4 weeks if syphilis is clinically suspect.              Sickle Cell Screen: No results found for: SICKLE  Hepatitis B Surface Antigen:   Hepatitis B Surface Ag   Date Value Ref Range Status   05/26/2022 NONREACTIVE NONREACTIVE Final     HIV:  No results found for: Nolvia Osorio           Results for orders placed or performed during the hospital encounter of 09/28/22   DRUG SCREEN MULTI URINE   Result Value Ref Range    Amphetamine Screen, Ur NEGATIVE NEGATIVE    Barbiturate Screen, Ur NEGATIVE NEGATIVE    Benzodiazepine Screen, Urine NEGATIVE NEGATIVE    Cocaine Metabolite, Urine NEGATIVE NEGATIVE    Methadone Screen, Urine NEGATIVE NEGATIVE    Opiates, Urine NEGATIVE NEGATIVE    Phencyclidine, Urine NEGATIVE NEGATIVE    Propoxyphene, Urine NEGATIVE NEGATIVE    Cannabinoid Scrn, Ur NEGATIVE NEGATIVE    Oxycodone Screen, Ur NEGATIVE NEGATIVE    Methamphetamine, Urine NEGATIVE NEGATIVE    Tricyclic Antidepressants, Urine NEGATIVE NEGATIVE    Buprenorphine Urine NEGATIVE NEGATIVE   CBC auto differential   Result Value Ref Range    WBC 7.9 4.5 - 13.5 k/uL    RBC 3.76 (L) 3.95 - 5.11 m/uL    Hemoglobin 10.8 (L) 11.9 - 15.1 g/dL    Hematocrit 32.3 (L) 36.3 - 47.1 %    MCV 85.9 82.6 - 102.9 fL    MCH 28.7 25.2 - 33.5 pg    MCHC 33.4 28.4 - 34.8 g/dL    RDW 12.6 11.8 - 14.4 %    Platelets 759 770 - 823 k/uL    MPV 10.5 8.1 - 13.5 fL    NRBC Automated 0.0 0.0 per 100 WBC    Seg Neutrophils 72 (H) 34 - 64 %    Lymphocytes 18 (L) 25 - 45 %    Monocytes 8 2 - 8 %    Eosinophils % 1 1 - 4 %    Basophils 0 0 - 2 %    Immature Granulocytes 1 (H) 0 %    Segs Absolute 5.77 1.50 - 8.10 k/uL    Absolute Lymph # 1.42 1.10 - 3.70 k/uL    Absolute Mono # 0.61 0.10 - 1.40 k/uL    Absolute Eos # 0.05 0.00 - 0.44 k/uL    Basophils Absolute 0.03 0.00 - 0.20 k/uL    Absolute Immature Granulocyte 0.06 0.00 - 0.30 k/uL       ASSESSMENT AND PLAN:    Estimated length of stay: 2 midnights post vaginal delivery    Principal Problem:    Leakage of amniotic fluid    Active Problems:    38 weeks gestation of pregnancy        Labor: Admit, anticipate normal delivery, routine labor orders  Fetus: Reassuring, Cat 1  GBS: No  Other: pitocin    Reviewed plan with Dr. Zaki Bonilla, [address] [place] [address]  11:48 PM

## 2022-09-30 VITALS
DIASTOLIC BLOOD PRESSURE: 73 MMHG | WEIGHT: 195 LBS | SYSTOLIC BLOOD PRESSURE: 116 MMHG | BODY MASS INDEX: 31.34 KG/M2 | HEIGHT: 66 IN | RESPIRATION RATE: 18 BRPM | TEMPERATURE: 98 F | HEART RATE: 76 BPM

## 2022-09-30 PROBLEM — Z3A.38 38 WEEKS GESTATION OF PREGNANCY: Status: RESOLVED | Noted: 2022-09-28 | Resolved: 2022-09-30

## 2022-09-30 PROBLEM — O42.90 LEAKAGE OF AMNIOTIC FLUID: Status: RESOLVED | Noted: 2022-09-28 | Resolved: 2022-09-30

## 2022-09-30 PROCEDURE — 6370000000 HC RX 637 (ALT 250 FOR IP): Performed by: ADVANCED PRACTICE MIDWIFE

## 2022-09-30 PROCEDURE — 99024 POSTOP FOLLOW-UP VISIT: CPT | Performed by: OBSTETRICS & GYNECOLOGY

## 2022-09-30 RX ADMIN — IBUPROFEN 800 MG: 800 TABLET, FILM COATED ORAL at 00:57

## 2022-09-30 ASSESSMENT — PAIN DESCRIPTION - LOCATION: LOCATION: BACK

## 2022-09-30 ASSESSMENT — PAIN SCALES - GENERAL: PAINLEVEL_OUTOF10: 4

## 2022-09-30 ASSESSMENT — PAIN DESCRIPTION - ORIENTATION: ORIENTATION: LOWER

## 2022-09-30 NOTE — FLOWSHEET NOTE
Pt discharged home. Discharge instructions reviewed with pt. Copy of instructions given. Pt has pp f/u appointment scheduled. All questions answered, pt off unit in stable condition.

## 2022-09-30 NOTE — DISCHARGE SUMMARY
Vaginal Delivery Discharge Summary    Gestational Age:38w6d    Antepartum complications: none    Date of Delivery:   Information for the patient's :  Adis De La Fuente [[id number]]   2022       Type of Delivery: Vaginal     Labs: CBC   Lab Results   Component Value Date    HGB 10.8 (L) 2022    HCT 32.3 (L) 2022        Intrapartum complications: None    Postpartum complications: none    Discharge Medications:      Medication List        CONTINUE taking these medications      PRENATAL VITAMINS PO              The patient is ambulating well. The patient is tolerating a normal diet.     Discharged to: Home     Discharge Date: 2022    Plan:   Follow up in 2 weeks    Autumn Vasquez MD

## 2022-09-30 NOTE — PLAN OF CARE
Problem: Postpartum  Goal: Experiences normal postpartum course  Description:  Postpartum OB-Pregnancy care plan goal which identifies if the mother is experiencing a normal postpartum course  2022 by Hina Leavitt RN  Outcome: Progressing  2022 1257 by Mansi Whitfield RN  Outcome: Progressing  Goal: Appropriate maternal -  bonding  Description:  Postpartum OB-Pregnancy care plan goal which identifies if the mother and  are bonding appropriately  2022 by Hina Leavitt RN  Outcome: Progressing  2022 1257 by Mansi Whitfield RN  Outcome: Progressing  Goal: Establishment of infant feeding pattern  Description:  Postpartum OB-Pregnancy care plan goal which identifies if the mother is establishing a feeding pattern with their   2022 by Hina Leavitt RN  Outcome: Progressing  2022 1257 by Mansi Whitfield RN  Outcome: Progressing  Goal: Incisions, wounds, or drain sites healing without S/S of infection  2022 by Hina Leavitt RN  Outcome: Progressing  2022 1257 by Mansi Whitfield RN  Outcome: Progressing     Problem: Pain  Goal: Verbalizes/displays adequate comfort level or baseline comfort level  2022 by Hina Leavitt RN  Outcome: Progressing  Flowsheets (Taken 2022)  Verbalizes/displays adequate comfort level or baseline comfort level:   Encourage patient to monitor pain and request assistance   Assess pain using appropriate pain scale   Administer analgesics based on type and severity of pain and evaluate response  2022 1257 by Mansi Whitfield RN  Outcome: Progressing     Problem: Infection - Adult  Goal: Absence of infection at discharge  2022 by Hina Leavitt RN  Outcome: Progressing  2022 1257 by Mansi Whitfield RN  Outcome: Progressing  Goal: Absence of infection during hospitalization  2022 by Hina Leavitt RN  Outcome: Progressing  9/29/2022 1257 by Thelma Batista RN  Outcome: Progressing  Goal: Absence of fever/infection during anticipated neutropenic period  9/29/2022 2104 by Barbara Rivera RN  Outcome: Progressing  9/29/2022 1257 by Thelma Batista RN  Outcome: Progressing     Problem: Safety - Adult  Goal: Free from fall injury  9/29/2022 2104 by Barbara Rivera RN  Outcome: Progressing  9/29/2022 1257 by Thelma Batista RN  Outcome: Progressing     Problem: Discharge Planning  Goal: Discharge to home or other facility with appropriate resources  9/29/2022 2104 by Barbara Rivera RN  Outcome: Progressing  9/29/2022 1257 by Thelma Batista RN  Outcome: Progressing     Problem: Chronic Conditions and Co-morbidities  Goal: Patient's chronic conditions and co-morbidity symptoms are monitored and maintained or improved  Outcome: Progressing

## 2022-09-30 NOTE — PLAN OF CARE
Problem: Postpartum  Goal: Experiences normal postpartum course  Description:  Postpartum OB-Pregnancy care plan goal which identifies if the mother is experiencing a normal postpartum course  2022 08 by Lion Henao RN  Outcome: Completed  2022 by Grace Meier RN  Outcome: Progressing  Goal: Appropriate maternal -  bonding  Description:  Postpartum OB-Pregnancy care plan goal which identifies if the mother and  are bonding appropriately  2022 08 by Lion Henao RN  Outcome: Completed  2022 by Grace Meier RN  Outcome: Progressing  Goal: Establishment of infant feeding pattern  Description:  Postpartum OB-Pregnancy care plan goal which identifies if the mother is establishing a feeding pattern with their   2022 by Lion Henao RN  Outcome: Completed  2022 by Grace Meier RN  Outcome: Progressing  Goal: Incisions, wounds, or drain sites healing without S/S of infection  2022 by Lion Henao RN  Outcome: Completed  2022 by Grace Meier RN  Outcome: Progressing     Problem: Pain  Goal: Verbalizes/displays adequate comfort level or baseline comfort level  2022 08 by Lion Henao RN  Outcome: Completed  Flowsheets (Taken 2022 06 by Grace Meier RN)  Verbalizes/displays adequate comfort level or baseline comfort level:   Encourage patient to monitor pain and request assistance   Assess pain using appropriate pain scale  2022 by Grace Meier RN  Outcome: Progressing  Flowsheets (Taken 2022)  Verbalizes/displays adequate comfort level or baseline comfort level:   Encourage patient to monitor pain and request assistance   Assess pain using appropriate pain scale   Administer analgesics based on type and severity of pain and evaluate response     Problem: Infection - Adult  Goal: Absence of infection at discharge  2022 by Jeannie Andrea RN  Outcome: Completed  9/29/2022 2104 by Claudene Ring, RN  Outcome: Progressing  Goal: Absence of infection during hospitalization  9/30/2022 0835 by Jeannie Andrea RN  Outcome: Completed  9/29/2022 2104 by Claudene Ring, RN  Outcome: Progressing  Goal: Absence of fever/infection during anticipated neutropenic period  9/30/2022 0835 by Jeannie Andrea RN  Outcome: Completed  9/29/2022 2104 by Claudene Ring, RN  Outcome: Progressing     Problem: Safety - Adult  Goal: Free from fall injury  9/30/2022 0835 by Jeannie Anrdea RN  Outcome: Completed  9/29/2022 2104 by Claudene Ring, RN  Outcome: Progressing     Problem: Discharge Planning  Goal: Discharge to home or other facility with appropriate resources  9/30/2022 0835 by Jeannie Andrea RN  Outcome: Completed  9/29/2022 2104 by Claudene Ring, RN  Outcome: Progressing     Problem: Chronic Conditions and Co-morbidities  Goal: Patient's chronic conditions and co-morbidity symptoms are monitored and maintained or improved  9/30/2022 0835 by Jeannie Andrea RN  Outcome: Completed  9/29/2022 2104 by Claudene Ring, RN  Outcome: Progressing

## 2022-09-30 NOTE — DISCHARGE INSTRUCTIONS
Follow-up with your [facility] doctor as specified. [id number] Department phone: [phone]    [facility] [id number]   Jovany [phone]   or Amanuel Neva [phone]     DIET  Eat a well balanced diet focusing on foods high in fiber and protein. Drink plenty of fluids especially water. To avoid constipation you may take a mild stool softener as recommended by your doctor or midwife. ACTIVITY  Gradually increase your activity. Resume exercise regimen only after advice by your doctor or midwife. Avoid lifting anything heavier than a gallon of milk for SIX weeks. Avoid driving until your doctor or midwife has given their approval.  Velma Rom slowly from a lying to sitting and then a standing position. Climb stairs one at a time. Use caution when carrying your baby up and down the stairs. NO SEXUAL Activity for 4-6 weeks or until advised by your doctor; Nothing in vagina: intercourse, tampons, or douching. Be prepared to discuss family planning at your follow-up OB visit. You may feel tired or have a lack of energy. You may continue your prenatal vitamin to replenish nutrients post delivery. Nap when baby naps to catch up on sleep. EMOTIONS  You may feel zambrano, sad, teary, & overwhelmed. Contact your OB provider if you feel you may be showing signs of postpartum depression, or have thoughts of harming yourself or your infant. If infant will not stop crying, contact another adult for help or place infant in their crib on their back and take a break. NEVER shake your infant. BLEEDING  Vaginal bleeding will decrease in amount over the next few weeks. You will notice that as your activity increases, your flow may increase. This is your body's way of telling you, you need to take things easier and rest more often.   Call your care provider if you are saturating more than one maxi pad in an hour & resting does not help. BREAST CARE  Take medications as recommended by your doctor or midwife for pain  If you develop a warm, red, tender area on your breast or develop a fever contact your OB provider. For breastfeeding moms:  If you become engorged, feeding may be more difficult or painful for 1-2 days. You may find it helpful to hand express some milk so that the infant can latch on more easily. While breastfeeding, continue to take your prenatal vitamins as directed by your doctor or midwife. Refer to the breastfeeding booklet in the  folder/binder for more information. If you feel you need more assistance or have questions, please call Marga Vazquez, lactation consultant, at [phone] or the OB department to schedule an appointment or phone consultation. For more FREE breastfeeding help, visit the Breastfeeding Support Group on Monday evenings at 7 pm in the [facility] department. For NON-breastfeeding moms:  You may apply ice packs to your breasts over your bra for twenty minutes at a time for comfort. Avoid stimulation to your breasts, when showering allow the water to strike your back not your breasts. Wear a good fitting bra until your milk dries, such as a sports bra. JOE CARE    Use the joe-bottle after toileting until bleeding stops. Cleanse your perineum from front to back  If used, stitches will dissolve in 4-6 weeks. You may use a sitz bath or soak in a clean tub as needed for comfort. Kegel exercises will help restore bladder control. SWELLING  Try to keep your legs elevated when you are sitting. When lying down keep your legs elevated. When wearing stocking or socks, make sure they are not too tight. WHEN TO CALL THE DOCTOR  If you have a temp of 100.6 or more. If your bleeding has increased and you are saturating a pad in an hour. Your abdomen is tender to touch. You are passing blood clots bigger than the size of a lemon.   If you are experiencing extreme weakness or dizziness. If you are having flu-like symptoms such as achy muscles or joints. There is a foul smell or a green color to your vaginal bleeding. If you have pain that cannot be relieved. You have persistent burning or frequency with urination. Call if you have concerns about your well-being. You are unable to sleep, eat, or are having thoughts of harming yourself or your baby. You have swelling, bleeding, drainage, foul odor, redness, or warmth in/around your incision or stitches. You have a red, warm, tender area in your calf.

## 2022-10-18 ENCOUNTER — TELEPHONE (OUTPATIENT)
Dept: OBGYN | Age: 22
End: 2022-10-18

## 2022-10-18 NOTE — TELEPHONE ENCOUNTER
Post Partum Phone Call Vaginal delivery  Follow Up 2 weeks herbie after discharge      Date of service: 10/18/2022    Amanda Cameron  Is a 25 y.o. Delivery date 9/28    Type of delivery:  Spontaneous vaginal delivery    Laceration:No,     Infant gender: female    Infant name: Sonali Dixon    Are you breast or bottle feeding? Breast fed, going well    How did first pediatrician visit go: good    How are you feeling: feel good    How is your bleeding: mostly spotting depending on activity a little heavier. Any questions or concerns: NO questions or concerns. Are you on any medications for depression. no    Information given to pt. EPPDS:   Postpartum Depression Scale 7/6/2020   I have been able to laugh and see the funny side of things As much as I always could   I have looked forward with enjoyment to things Rather less than I used to   I have blamed myself unnecessarily when things went wrong Yes, some of the time   I have been anxious or worried for no good reason No, not at all   I have felt scared or panicky for no good reason No, not at all   I haven't been able to cope lately Yes, sometimes I haven't been coping as well as usual   I have been so unhappy that I have had difficulty sleeping Not at all   I have felt sad or miserable Yes, quite often   I have been so unhappy that I have been crying Only occasionally   The thought of harming myself has occurred to me Never   Total 8       If above 10 schedule pt and appointment. Within a few days    Lets schedule your appt now -   Date - [email or web address]  What for -     History of thyroid issues? No  If yes order TSH with reflux to have done in lab within a week

## 2022-10-18 NOTE — TELEPHONE ENCOUNTER
Attempted 2 week post partum phone call. Patient answered first call, stated to call back in a hour and a half. I called back in the time and patient didn't answer.  LVM

## 2022-11-09 ENCOUNTER — TELEPHONE (OUTPATIENT)
Dept: OBGYN | Age: 22
End: 2022-11-09

## 2022-11-09 NOTE — TELEPHONE ENCOUNTER
Patient aware and will see how tonight goes. If it continues to be heavy will call and schedule an appt.

## 2022-11-09 NOTE — TELEPHONE ENCOUNTER
Patient called and stated that she had stopped bleeding after delivery (9/28) after 2 weeks. Patient started bleeding again and states it is very heavy. She is buying the biggest tampons she can buy and is bleeding through one in 2 hours. Patient educated on bleeding on and off for up to 12 weeks and typically after delivery tampons are not recommended. Patient voices understanding and will use pads. Patient instructed if she soaks a pad in an hour to go to the ER.  Patient wanted confirmation from a provider that this much bleeding is normal.

## 2022-11-14 ENCOUNTER — POSTPARTUM VISIT (OUTPATIENT)
Dept: OBGYN | Age: 22
End: 2022-11-14
Payer: COMMERCIAL

## 2022-11-14 VITALS — WEIGHT: 179 LBS | DIASTOLIC BLOOD PRESSURE: 70 MMHG | BODY MASS INDEX: 28.89 KG/M2 | SYSTOLIC BLOOD PRESSURE: 110 MMHG

## 2022-11-14 PROCEDURE — G8484 FLU IMMUNIZE NO ADMIN: HCPCS | Performed by: ADVANCED PRACTICE MIDWIFE

## 2022-11-14 PROCEDURE — G8427 DOCREV CUR MEDS BY ELIG CLIN: HCPCS | Performed by: ADVANCED PRACTICE MIDWIFE

## 2022-11-14 PROCEDURE — G8417 CALC BMI ABV UP PARAM F/U: HCPCS | Performed by: ADVANCED PRACTICE MIDWIFE

## 2022-11-14 PROCEDURE — 1036F TOBACCO NON-USER: CPT | Performed by: ADVANCED PRACTICE MIDWIFE

## 2022-11-14 NOTE — PROGRESS NOTES
POSTPARTUM EXAM    Date of service: 2022    Tori Walsh  Is a 25 y.o.  female    PT's PCP is: Md Desi Jo     : 2000     OB History    Para Term  AB Living   4 4 4 0 0 4   SAB IAB Ectopic Molar Multiple Live Births   0 0 0   0 4      # Outcome Date GA Lbr Abdiel/2nd Weight Sex Delivery Anes PTL Lv   4 Term 22 38w6d  6 lb 14.7 oz (3.138 kg) F Vag-Spont None N KAY   3 Term 20 38w3d / 00:04 6 lb 12.7 oz (3.081 kg) M Vag-Spont None N KAY   2 Term 19 38w6d 05:11 / 00:01 6 lb 0.9 oz (2.747 kg) F Vag-Spont  N KAY      Complications: Oligohydramnios   1 Term 05/15/17 39w5d 12:11 / 00:52 7 lb 7.5 oz (3.388 kg) M Vag-Spont EPI N KAY        Social History     Tobacco Use   Smoking Status Never    Passive exposure: Yes   Smokeless Tobacco Never         Social History     Substance and Sexual Activity   Alcohol Use No         Delivery date 2022    Type of delivery:  Spontaneous vaginal delivery    Laceration:No,     Infant gender: female    Infant name: Cathy     Are you breast or bottle feeding? Breast    Have you been sexually active since delivery? No    Vital Signs: Blood pressure 110/70, weight 179 lb (81.2 kg), last menstrual period 2021, currently breastfeeding. Labs:    Blood Type and Rh: A POSITIVE          Allergies: Patient has no known allergies. Current Outpatient Medications:     Prenatal Vit-Fe Fumarate-FA (PRENATAL VITAMINS PO), Take by mouth, Disp: , Rfl:     Last Yearly: beginning of pregnancy    Last pap: Never    Last HPV: Never    Chief Complaint   Patient presents with    Postpartum Care     Patient here for 6 week post partum visit. Doing well and denies any questions or concerns.          How many Hours of sleep do you get a night: 8-9    Do you have a normal appetite: yes    Any problems or pain: no    Do you feel like you coping well: yeah    Is baby sleeping:good sleeper    How is baby eating: yes    How did first pediatrician visit go: went well. EPPDS:   Postpartum Depression Scale 11/14/2022   I have been able to laugh and see the funny side of things As much as I always could   I have looked forward with enjoyment to things As much as I ever did   I have blamed myself unnecessarily when things went wrong No, never   I have been anxious or worried for no good reason No, not at all   I have felt scared or panicky for no good reason No, not at all   I haven't been able to cope lately No, I have been coping as well as ever   I have been so unhappy that I have had difficulty sleeping Not at all   I have felt sad or miserable No, not at all   I have been so unhappy that I have been crying No, never   The thought of harming myself has occurred to me Never   Total 0         No results found for this visit on 11/14/22. History of gestational diabetes? No  If yes order 2 hr GTT    Nurse: Carolyn Mcnair LPN    HPI:  PT presents for Post partum exam Follow up in 6 week(s) after delivery. Patient states last week she had heavy bleeding and called in the office however Dr. Ileana Dodson said she could seen but she said she knew she was having appointment. Patient states this lasted about a week however she is not bleeding any longer      Objective  Lymphatic:   no lymphadenopathy  Heent:   negative   Cor: regular rate and rhythm, no murmurs              Pul:clear to auscultation bilaterally- no wheezes, rales or rhonchi, normal air movement, no respiratory distress      GI: Abdomen soft, non-tender. BS normal. No masses,  No organomegaly           Extremities: normal strength, tone, and muscle mass                                 Assessment and Plan          Diagnosis Orders   1. Postpartum care and examination  POCT hemoglobin                I am having Philip Pickett. Michael maintain her Prenatal Vit-Fe Fumarate-FA (PRENATAL VITAMINS PO). Return in about 3 months (around 2/22/2023) for yearly first pap.     She was also counseled on her preventative health maintenance recommendations and follow-up. There are no Patient Instructions on file for this visit.     OSWALDO Dalton CNM,11/14/2022 1:35 PM

## 2023-02-20 ENCOUNTER — HOSPITAL ENCOUNTER (OUTPATIENT)
Age: 23
Setting detail: SPECIMEN
Discharge: HOME | End: 2023-02-20
Payer: COMMERCIAL

## 2023-02-20 ENCOUNTER — OFFICE VISIT (OUTPATIENT)
Dept: OBGYN | Age: 23
End: 2023-02-20
Payer: COMMERCIAL

## 2023-02-20 VITALS
DIASTOLIC BLOOD PRESSURE: 74 MMHG | WEIGHT: 179 LBS | BODY MASS INDEX: 28.77 KG/M2 | HEIGHT: 66 IN | SYSTOLIC BLOOD PRESSURE: 116 MMHG

## 2023-02-20 DIAGNOSIS — Z01.419 WOMEN'S ANNUAL ROUTINE GYNECOLOGICAL EXAMINATION: Primary | ICD-10-CM

## 2023-02-20 DIAGNOSIS — Z01.419 WOMEN'S ANNUAL ROUTINE GYNECOLOGICAL EXAMINATION: ICD-10-CM

## 2023-02-20 PROBLEM — N93.9 VAGINAL BLEEDING: Status: RESOLVED | Noted: 2022-09-28 | Resolved: 2023-02-20

## 2023-02-20 PROBLEM — T39.1X2A ACETAMINOPHEN OVERDOSE, INTENTIONAL SELF-HARM, INITIAL ENCOUNTER (HCC): Status: RESOLVED | Noted: 2020-06-24 | Resolved: 2023-02-20

## 2023-02-20 PROBLEM — O99.810 IMPAIRED GLUCOSE TOLERANCE IN PREGNANCY: Status: RESOLVED | Noted: 2020-03-30 | Resolved: 2023-02-20

## 2023-02-20 PROBLEM — S93.401A SPRAIN OF RIGHT ANKLE: Status: RESOLVED | Noted: 2020-03-30 | Resolved: 2023-02-20

## 2023-02-20 PROBLEM — N91.2 AMENORRHEA: Status: RESOLVED | Noted: 2020-03-30 | Resolved: 2023-02-20

## 2023-02-20 PROCEDURE — G8484 FLU IMMUNIZE NO ADMIN: HCPCS | Performed by: ADVANCED PRACTICE MIDWIFE

## 2023-02-20 PROCEDURE — 99395 PREV VISIT EST AGE 18-39: CPT | Performed by: ADVANCED PRACTICE MIDWIFE

## 2023-02-20 PROCEDURE — G0145 SCR C/V CYTO,THINLAYER,RESCR: HCPCS

## 2023-02-20 ASSESSMENT — PATIENT HEALTH QUESTIONNAIRE - PHQ9
SUM OF ALL RESPONSES TO PHQ QUESTIONS 1-9: 0
SUM OF ALL RESPONSES TO PHQ9 QUESTIONS 1 & 2: 0
9. THOUGHTS THAT YOU WOULD BE BETTER OFF DEAD, OR OF HURTING YOURSELF: 0
10. IF YOU CHECKED OFF ANY PROBLEMS, HOW DIFFICULT HAVE THESE PROBLEMS MADE IT FOR YOU TO DO YOUR WORK, TAKE CARE OF THINGS AT HOME, OR GET ALONG WITH OTHER PEOPLE: 0
2. FEELING DOWN, DEPRESSED OR HOPELESS: 0
SUM OF ALL RESPONSES TO PHQ QUESTIONS 1-9: 0
3. TROUBLE FALLING OR STAYING ASLEEP: 0
6. FEELING BAD ABOUT YOURSELF - OR THAT YOU ARE A FAILURE OR HAVE LET YOURSELF OR YOUR FAMILY DOWN: 0
1. LITTLE INTEREST OR PLEASURE IN DOING THINGS: 0
SUM OF ALL RESPONSES TO PHQ QUESTIONS 1-9: 0
SUM OF ALL RESPONSES TO PHQ QUESTIONS 1-9: 0
7. TROUBLE CONCENTRATING ON THINGS, SUCH AS READING THE NEWSPAPER OR WATCHING TELEVISION: 0
5. POOR APPETITE OR OVEREATING: 0
8. MOVING OR SPEAKING SO SLOWLY THAT OTHER PEOPLE COULD HAVE NOTICED. OR THE OPPOSITE, BEING SO FIGETY OR RESTLESS THAT YOU HAVE BEEN MOVING AROUND A LOT MORE THAN USUAL: 0
4. FEELING TIRED OR HAVING LITTLE ENERGY: 0

## 2023-02-20 NOTE — PROGRESS NOTES
YEARLY PHYSICAL    Date of service: 2023    Rene Red  Is a 25 y.o.  , female    PT's PCP is: On File Not (Inactive)     : 2000                                             Subjective:       Patient's last menstrual period was 2023 (approximate). Are your menses regular: not really since delivery. OB History    Para Term  AB Living   4 4 4 0 0 4   SAB IAB Ectopic Molar Multiple Live Births   0 0 0   0 4      # Outcome Date GA Lbr Abdiel/2nd Weight Sex Delivery Anes PTL Lv   4 Term 22 38w6d  6 lb 14.7 oz (3.138 kg) F Vag-Spont None N KAY   3 Term 20 38w3d / 00:04 6 lb 12.7 oz (3.081 kg) M Vag-Spont None N KAY   2 Term 19 38w6d 05:11 / 00:01 6 lb 0.9 oz (2.747 kg) F Vag-Spont  N KAY      Complications: Oligohydramnios   1 Term 05/15/17 39w5d 12:11 / 00:52 7 lb 7.5 oz (3.388 kg) M Vag-Spont EPI N KAY        Social History     Tobacco Use   Smoking Status Never    Passive exposure: Yes   Smokeless Tobacco Never        Social History     Substance and Sexual Activity   Alcohol Use No       History reviewed. No pertinent family history. Any family history of breast or ovarian cancer: No    Any family history of blood clots: No    Have you had a positive covid test: Yes    Have you had the covid immunization: No      Allergies: Patient has no known allergies. Current Outpatient Medications:     Prenatal Vit-Fe Fumarate-FA (PRENATAL VITAMINS PO), Take by mouth (Patient not taking: Reported on 2023), Disp: , Rfl:     Social History     Substance and Sexual Activity   Sexual Activity Yes    Partners: Male    Birth control/protection: I.U.D.        Any bleeding or pain with intercourse: No    Last Yearly date:  never     Last pap date and results: never    Last HPV date and results: never    Last Mammogram date and results: never    Last Dexa scan date and results: never    Last colorectal screen- type:  date  never results never    Do you do self breast exams: No    Past Medical History:   Diagnosis Date    Ankle fracture 2006    R. growth plate area    Clavicle fracture 2009 and 2011    Major depression, chronic 6/25/2020       Past Surgical History:   Procedure Laterality Date    GALLBLADDER SURGERY      WISDOM TOOTH EXTRACTION         History reviewed. No pertinent family history. Chief Complaint   Patient presents with    Annual Exam     Patient here for yearly exam. Last PAP never. Last HPV never. Last mammogram never. No questions or concerns voiced. Denies want for Cycle control or STD testing. PE:  Vital Signs  Blood pressure 116/74, height 5' 6\" (1.676 m), weight 179 lb (81.2 kg), last menstrual period 02/12/2023, currently breastfeeding. Estimated body mass index is 28.89 kg/m² as calculated from the following:    Height as of this encounter: 5' 6\" (1.676 m). Weight as of this encounter: 179 lb (81.2 kg). Labs:    No results found for this visit on 02/20/23. PHQ-9 Total Score: 0 (2/20/2023  1:08 PM)  Thoughts that you would be better off dead, or of hurting yourself in some way: 0 (2/20/2023  1:08 PM)    NURSE: OSWALDO Galdamez CNM      HPI: Today for routine well woman exam.  Patient denies needs or concerns at this time. This is patient's first Pap    Review of Systems   All other systems reviewed and are negative. Objective  Lymphatic:   no lymphadenopathy  Heent:   negative   Cor: regular rate and rhythm, no murmurs              Pul:clear to auscultation bilaterally- no wheezes, rales or rhonchi, normal air movement, no respiratory distress      GI: Abdomen soft, non-tender.  BS normal. No masses,  No organomegaly           Extremities: normal strength, tone, and muscle mass   Breasts: Breast:normal appearance, no masses or tenderness   Pelvic Exam: GENITAL/URINARY:  External Genitalia:  General appearance; normal, Hair distribution; normal, Lesions absent  Urethra: Fullness absent, Masses absent  Bladder:  Fullness absent, Masses absent, Tenderness absent, Cystocele absent  Vagina:  General appearance normal, Estrogen effect normal, Discharge absent, Lesions absent, Pelvic support normal  Cervix:  General appearance normal, Lesions absent, Discharge absent, Tenderness absent, Enlargement absent, Nodularity absent  Uterus:  Size normal, Contour normal, Position normal, Masses absent  Adenexa: Masses absent, Tenderness absent                                    Vaginal discharge: no vaginal discharge                              Assessment and Plan          Diagnosis Orders   1. Women's annual routine gynecological examination  PAP SMEAR                I am having Tessie Rizvi. Michael maintain her Prenatal Vit-Fe Fumarate-FA (PRENATAL VITAMINS PO). Return in about 1 year (around 2/20/2024) for yearly. She was also counseled on her preventative health maintenance recommendations and follow-up. There are no Patient Instructions on file for this visit.     OSWALDO Rodas CNM,2/20/2023 1:24 PM

## 2023-05-01 ENCOUNTER — OFFICE VISIT (OUTPATIENT)
Dept: OBGYN | Age: 23
End: 2023-05-01
Payer: COMMERCIAL

## 2023-05-01 VITALS
SYSTOLIC BLOOD PRESSURE: 120 MMHG | WEIGHT: 189 LBS | DIASTOLIC BLOOD PRESSURE: 76 MMHG | HEIGHT: 66 IN | BODY MASS INDEX: 30.37 KG/M2

## 2023-05-01 DIAGNOSIS — Z71.9: Primary | ICD-10-CM

## 2023-05-01 PROCEDURE — 99212 OFFICE O/P EST SF 10 MIN: CPT | Performed by: OBSTETRICS & GYNECOLOGY

## 2023-05-01 PROCEDURE — G8417 CALC BMI ABV UP PARAM F/U: HCPCS | Performed by: OBSTETRICS & GYNECOLOGY

## 2023-05-01 PROCEDURE — G8427 DOCREV CUR MEDS BY ELIG CLIN: HCPCS | Performed by: OBSTETRICS & GYNECOLOGY

## 2023-05-01 PROCEDURE — 1036F TOBACCO NON-USER: CPT | Performed by: OBSTETRICS & GYNECOLOGY

## 2023-05-01 NOTE — PROGRESS NOTES
PROBLEM VISIT     Date of service: 2023    Nanette Jennings  Is a 26 y.o.  female    PT's PCP is: On File Not (Inactive)     : 2000                                             Subjective:       Patient's last menstrual period was 04/15/2023 (approximate). OB History    Para Term  AB Living   4 4 4 0 0 4   SAB IAB Ectopic Molar Multiple Live Births   0 0 0   0 4      # Outcome Date GA Lbr Abdiel/2nd Weight Sex Delivery Anes PTL Lv   4 Term 22 38w6d  6 lb 14.7 oz (3.138 kg) F Vag-Spont None N KAY   3 Term 20 38w3d / 00:04 6 lb 12.7 oz (3.081 kg) M Vag-Spont None N KAY   2 Term 19 38w6d 05:11 / 00:01 6 lb 0.9 oz (2.747 kg) F Vag-Spont  N KAY      Complications: Oligohydramnios   1 Term 05/15/17 39w5d 12:11 / 00:52 7 lb 7.5 oz (3.388 kg) M Vag-Spont EPI N KAY        Social History     Tobacco Use   Smoking Status Never    Passive exposure: Yes   Smokeless Tobacco Never        Social History     Substance and Sexual Activity   Alcohol Use No       Social History     Substance and Sexual Activity   Sexual Activity Yes    Partners: Male       Allergies: Patient has no known allergies. Chief Complaint   Patient presents with    Other     Pt is here today to have clearance to become a surrogate. Last Yearly:  23    Last pap: 23(ascus)    Last HPV: 23(-)      NURSE: LMD  PE:  Vital Signs  Blood pressure 120/76, height 5' 6\" (1.676 m), weight 189 lb (85.7 kg), last menstrual period 04/15/2023, not currently breastfeeding. Labs:    No results found for this visit on 23. NURSE: Yrn Acosta    HPI: The patient is here today as she wishes to to have a surrogate pregnancy. She has no high risk factors in her history. She has had 4 uncomplicated term pregnancies    Yes  PT denies fever, chills, nausea and vomiting       Objective: Heart regular rate and rhythm without murmur. Lungs are clear.   Neck, thyroid is not enlarged

## 2023-05-11 ENCOUNTER — TELEPHONE (OUTPATIENT)
Dept: OBGYN | Age: 23
End: 2023-05-11

## 2023-05-11 RX ORDER — NORGESTIMATE AND ETHINYL ESTRADIOL 0.25-0.035
1 KIT ORAL DAILY
Qty: 3 PACKET | Refills: 3 | Status: SHIPPED | OUTPATIENT
Start: 2023-05-11

## 2023-05-11 NOTE — TELEPHONE ENCOUNTER
Please review with pt how to start them and what to do if she misses one and the importance of the same time every day

## 2023-05-11 NOTE — TELEPHONE ENCOUNTER
Patient called and stated that she was just cleared to become a surrogate. Patient wanting to start cycle control to regulate cycles before insemination. Moe JONES for caller to leave a voicemail when calling back.

## 2023-09-19 RX ORDER — NORGESTIMATE AND ETHINYL ESTRADIOL 0.25-0.035
1 KIT ORAL DAILY
Qty: 3 PACKET | Refills: 3 | OUTPATIENT
Start: 2023-09-19

## 2023-10-10 ENCOUNTER — TELEPHONE (OUTPATIENT)
Dept: OBGYN | Age: 23
End: 2023-10-10

## 2023-10-10 NOTE — TELEPHONE ENCOUNTER
10/10/2023 11:40 AM EDT   UTERUS:retroverted, homogeneous echo pattern with increase in vascularity      ENDO:7mm in thickness      RT. OVARY:seen, follicles visualized      LT. OVARY:seen, simple cyst jerome- 2.4cm x 2.0cm x 1.7cm         Pt has not picked up birth control pills from the refill encounter.   Pt state for 3 months she has been having periods twice a month  Pt states when she has a bm she bleeds    Appt scheduled

## 2023-10-17 ENCOUNTER — HOSPITAL ENCOUNTER (OUTPATIENT)
Age: 23
Setting detail: SPECIMEN
Discharge: HOME | End: 2023-10-17
Payer: COMMERCIAL

## 2023-10-17 ENCOUNTER — OFFICE VISIT (OUTPATIENT)
Dept: OBGYN | Age: 23
End: 2023-10-17
Payer: COMMERCIAL

## 2023-10-17 VITALS
SYSTOLIC BLOOD PRESSURE: 120 MMHG | WEIGHT: 191 LBS | HEIGHT: 66 IN | BODY MASS INDEX: 30.7 KG/M2 | DIASTOLIC BLOOD PRESSURE: 80 MMHG

## 2023-10-17 DIAGNOSIS — N93.8 DUB (DYSFUNCTIONAL UTERINE BLEEDING): Primary | ICD-10-CM

## 2023-10-17 DIAGNOSIS — N91.2 AMENORRHEA: ICD-10-CM

## 2023-10-17 DIAGNOSIS — N93.8 DUB (DYSFUNCTIONAL UTERINE BLEEDING): ICD-10-CM

## 2023-10-17 DIAGNOSIS — Z11.3 SCREEN FOR STD (SEXUALLY TRANSMITTED DISEASE): ICD-10-CM

## 2023-10-17 LAB
B-HCG SERPL EIA 3RD IS-ACNC: <1 MIU/ML
FSH SERPL-ACNC: 2.4 MIU/ML
LH SERPL-ACNC: 8.8 MIU/ML (ref 1.7–8.6)
PROLACTIN SERPL-MCNC: 13.9 NG/ML (ref 4.79–23.3)
TSH SERPL DL<=0.05 MIU/L-ACNC: 1.3 UIU/ML (ref 0.3–5)

## 2023-10-17 PROCEDURE — 1036F TOBACCO NON-USER: CPT | Performed by: ADVANCED PRACTICE MIDWIFE

## 2023-10-17 PROCEDURE — 99213 OFFICE O/P EST LOW 20 MIN: CPT | Performed by: ADVANCED PRACTICE MIDWIFE

## 2023-10-17 PROCEDURE — 36415 COLL VENOUS BLD VENIPUNCTURE: CPT | Performed by: ADVANCED PRACTICE MIDWIFE

## 2023-10-17 PROCEDURE — 83002 ASSAY OF GONADOTROPIN (LH): CPT

## 2023-10-17 PROCEDURE — 84702 CHORIONIC GONADOTROPIN TEST: CPT

## 2023-10-17 PROCEDURE — 87491 CHLMYD TRACH DNA AMP PROBE: CPT

## 2023-10-17 PROCEDURE — G8484 FLU IMMUNIZE NO ADMIN: HCPCS | Performed by: ADVANCED PRACTICE MIDWIFE

## 2023-10-17 PROCEDURE — 84443 ASSAY THYROID STIM HORMONE: CPT

## 2023-10-17 PROCEDURE — 83001 ASSAY OF GONADOTROPIN (FSH): CPT

## 2023-10-17 PROCEDURE — G8417 CALC BMI ABV UP PARAM F/U: HCPCS | Performed by: ADVANCED PRACTICE MIDWIFE

## 2023-10-17 PROCEDURE — 87591 N.GONORRHOEAE DNA AMP PROB: CPT

## 2023-10-17 PROCEDURE — 84146 ASSAY OF PROLACTIN: CPT

## 2023-10-17 PROCEDURE — G8427 DOCREV CUR MEDS BY ELIG CLIN: HCPCS | Performed by: ADVANCED PRACTICE MIDWIFE

## 2023-10-17 NOTE — PROGRESS NOTES
PROBLEM VISIT     Date of service: 10/17/2023    Noe Goodwin  Is a 26 y.o. , female    PT's PCP is: Not, On File (Inactive)     : 2000                                             Subjective:       Patient's last menstrual period was 2023 (approximate). OB History    Para Term  AB Living   4 4 4 0 0 4   SAB IAB Ectopic Molar Multiple Live Births   0 0 0   0 4      # Outcome Date GA Lbr Abdiel/2nd Weight Sex Delivery Anes PTL Lv   4 Term 22 38w6d  6 lb 14.7 oz (3.138 kg) F Vag-Spont None N KAY   3 Term 20 38w3d / 00:04 6 lb 12.7 oz (3.081 kg) M Vag-Spont None N KAY   2 Term 19 38w6d 05:11 / 00:01 6 lb 0.9 oz (2.747 kg) F Vag-Spont  N KAY      Complications: Oligohydramnios   1 Term 05/15/17 39w5d 12:11 / 00:52 7 lb 7.5 oz (3.388 kg) M Vag-Spont EPI N KAY        Social History     Tobacco Use   Smoking Status Never    Passive exposure: Yes   Smokeless Tobacco Never        Social History     Substance and Sexual Activity   Alcohol Use Yes    Comment: occ       Allergies: Patient has no known allergies. Current Outpatient Medications:     norgestimate-ethinyl estradiol (ORTHO-CYCLEN, 28,) 0.25-35 MG-MCG per tablet, Take 1 tablet by mouth daily, Disp: 3 packet, Rfl: 3    Prenatal Vit-Fe Fumarate-FA (PRENATAL VITAMINS PO), Take by mouth (Patient not taking: Reported on 10/17/2023), Disp: , Rfl:     Social History     Substance and Sexual Activity   Sexual Activity Yes    Partners: Male    Comment: pill       Last Yearly date:      Last pap date and results: 23 ASCUS     Last HPV date and results: 23 neg     Have you had a positive covid test: Yes    Have you had the covid immunization: No    Chief Complaint   Patient presents with    Irregular Menses     Review gyn u/s from 10/9/23.  Pt was having 2 periods a month, if pt had a BM she would have vaginal bleeding, or she would also bleed after intercourse         PE:  Vital Signs  Blood

## 2023-10-18 LAB
C TRACH DNA SPEC QL PROBE+SIG AMP: NEGATIVE
N GONORRHOEA DNA SPEC QL PROBE+SIG AMP: NEGATIVE
SPECIMEN DESCRIPTION: NORMAL

## 2023-11-13 DIAGNOSIS — R11.2 NAUSEA AND VOMITING, UNSPECIFIED VOMITING TYPE: Primary | ICD-10-CM

## 2023-11-13 DIAGNOSIS — N91.2 AMENORRHEA: Primary | ICD-10-CM

## 2023-11-13 RX ORDER — ONDANSETRON 4 MG/1
4 TABLET, FILM COATED ORAL 3 TIMES DAILY PRN
Qty: 30 TABLET | Refills: 0 | Status: SHIPPED | OUTPATIENT
Start: 2023-11-13

## 2023-11-13 NOTE — TELEPHONE ENCOUNTER
Patient called stating she recently found out she was pregnant and is requesting Zofran. Zofran worked for her previously. Negative serum preg October 17th but hasn't had a period since September. Pharmacy verified, and order pended. Sent to [facility] to schedule New OB appointment.

## 2023-11-14 ENCOUNTER — HOSPITAL ENCOUNTER (OUTPATIENT)
Age: 23
Discharge: HOME | End: 2023-11-14
Payer: COMMERCIAL

## 2023-11-14 DIAGNOSIS — N91.2 AMENORRHEA: ICD-10-CM

## 2023-11-14 LAB — B-HCG SERPL EIA 3RD IS-ACNC: ABNORMAL MIU/ML

## 2023-11-14 PROCEDURE — 36415 COLL VENOUS BLD VENIPUNCTURE: CPT

## 2023-11-14 PROCEDURE — 84702 CHORIONIC GONADOTROPIN TEST: CPT

## 2023-11-16 ENCOUNTER — HOSPITAL ENCOUNTER (OUTPATIENT)
Age: 23
Setting detail: SPECIMEN
Discharge: HOME | End: 2023-11-16
Payer: COMMERCIAL

## 2023-11-16 ENCOUNTER — INITIAL PRENATAL (OUTPATIENT)
Dept: OBGYN | Age: 23
End: 2023-11-16
Payer: COMMERCIAL

## 2023-11-16 VITALS — SYSTOLIC BLOOD PRESSURE: 116 MMHG | DIASTOLIC BLOOD PRESSURE: 68 MMHG | WEIGHT: 185.8 LBS | BODY MASS INDEX: 29.99 KG/M2

## 2023-11-16 DIAGNOSIS — N91.2 AMENORRHEA: ICD-10-CM

## 2023-11-16 DIAGNOSIS — Z34.81 ENCOUNTER FOR SUPERVISION OF OTHER NORMAL PREGNANCY IN FIRST TRIMESTER: Primary | ICD-10-CM

## 2023-11-16 DIAGNOSIS — Z34.81 ENCOUNTER FOR SUPERVISION OF OTHER NORMAL PREGNANCY IN FIRST TRIMESTER: ICD-10-CM

## 2023-11-16 DIAGNOSIS — Z3A.01 6 WEEKS GESTATION OF PREGNANCY: ICD-10-CM

## 2023-11-16 LAB
ABO + RH BLD: NORMAL
BLOOD GROUP ANTIBODIES SERPL: NEGATIVE

## 2023-11-16 PROCEDURE — 87591 N.GONORRHOEAE DNA AMP PROB: CPT

## 2023-11-16 PROCEDURE — 86901 BLOOD TYPING SEROLOGIC RH(D): CPT

## 2023-11-16 PROCEDURE — 86803 HEPATITIS C AB TEST: CPT

## 2023-11-16 PROCEDURE — 87491 CHLMYD TRACH DNA AMP PROBE: CPT

## 2023-11-16 PROCEDURE — 86780 TREPONEMA PALLIDUM: CPT

## 2023-11-16 PROCEDURE — 99211 OFF/OP EST MAY X REQ PHY/QHP: CPT | Performed by: ADVANCED PRACTICE MIDWIFE

## 2023-11-16 PROCEDURE — G8427 DOCREV CUR MEDS BY ELIG CLIN: HCPCS | Performed by: ADVANCED PRACTICE MIDWIFE

## 2023-11-16 PROCEDURE — 86762 RUBELLA ANTIBODY: CPT

## 2023-11-16 PROCEDURE — G8417 CALC BMI ABV UP PARAM F/U: HCPCS | Performed by: ADVANCED PRACTICE MIDWIFE

## 2023-11-16 PROCEDURE — 86900 BLOOD TYPING SEROLOGIC ABO: CPT

## 2023-11-16 PROCEDURE — 85025 COMPLETE CBC W/AUTO DIFF WBC: CPT

## 2023-11-16 PROCEDURE — 87389 HIV-1 AG W/HIV-1&-2 AB AG IA: CPT

## 2023-11-16 PROCEDURE — 86850 RBC ANTIBODY SCREEN: CPT

## 2023-11-16 PROCEDURE — 36415 COLL VENOUS BLD VENIPUNCTURE: CPT | Performed by: ADVANCED PRACTICE MIDWIFE

## 2023-11-16 PROCEDURE — 87340 HEPATITIS B SURFACE AG IA: CPT

## 2023-11-16 PROCEDURE — H1000 PRENATAL CARE ATRISK ASSESSM: HCPCS | Performed by: ADVANCED PRACTICE MIDWIFE

## 2023-11-16 PROCEDURE — 87086 URINE CULTURE/COLONY COUNT: CPT

## 2023-11-16 RX ORDER — PNV NO.95/FERROUS FUM/FOLIC AC 28MG-0.8MG
1 TABLET ORAL DAILY
Qty: 90 TABLET | Refills: 3 | Status: SHIPPED | OUTPATIENT
Start: 2023-11-16

## 2023-11-16 NOTE — PROGRESS NOTES
covers preparation for birth, labor, vaginal and  delivery, recovery, and postpartum.   9 am - 3 pm   &  5:30 pm - 8:30 pm    9 am - 3 pm   &   5:30 pm - 8:30 pm  May 6   9 am - 3 pm   &   5:30 pm - 8:30 pm     9 am - 3 pm  September 6 &  5:30 pm - 8:30 pm    9 am - 3 pm   & 15 5:30 pm - 8:30 pm    Welcome, Little One! - This  care class covers  characteristics, testing, and basic care of your . Information on your [employer] and other support services is also offered during class.   5:30pm - 7:30 pm    5:30pm - 7:30 pm    5:30pm - 7:30 pm    5:30pm - 7:30 pm    5:30pm - 7:30 pm    5:30pm - 7:30 pm    There is no charge for classes. To register for classes call  Obstetrics Department at                 [address] [place] [address]? Breastfeeding Basics - This class covers preparation for breastfeeding, the basics of milk production, breastfeeding in the first few weeks, and breast pump basics.   5:30 pm - 7:30 pm    5:30 pm - 7:30 pm  May 9   5:30 pm - 7:30 pm     5:30 pm - 7:30 pm    5:30 pm - 7:30 pm    5:30 pm - 7:30 pm    Your Sensational Baby - Babies experience life with all their senses. Infant massage helps infants grow and thrive. This class teaches adult caregivers of infants the basics of massaging your baby. This 45-60-minute class is offered in a five-week series (plan to attend all five classes with your baby).     , 15,   11 am - noon  ,    11 am - noon  October 3, 10, 17, 24,  11 am - noon    Isn't It [place]? - Grandparents or other special adults are welcome to attend this session to discuss changes in childbirth, parenting philosophies, and how to encourage and support the parents and babies that we

## 2023-11-17 LAB
BASOPHILS # BLD: <0.03 K/UL (ref 0–0.2)
BASOPHILS NFR BLD: 0 % (ref 0–2)
CHLAMYDIA DNA UR QL NAA+PROBE: NEGATIVE
EOSINOPHIL # BLD: 0.07 K/UL (ref 0–0.44)
EOSINOPHILS RELATIVE PERCENT: 1 % (ref 1–4)
ERYTHROCYTE [DISTWIDTH] IN BLOOD BY AUTOMATED COUNT: 11.9 % (ref 11.8–14.4)
HBV SURFACE AG SERPL QL IA: NONREACTIVE
HCT VFR BLD AUTO: 38.2 % (ref 36.3–47.1)
HCV AB SERPL QL IA: NONREACTIVE
HGB BLD-MCNC: 12.6 G/DL (ref 11.9–15.1)
HIV 1+2 AB+HIV1 P24 AG SERPL QL IA: NONREACTIVE
IMM GRANULOCYTES # BLD AUTO: <0.03 K/UL (ref 0–0.3)
IMM GRANULOCYTES NFR BLD: 0 %
LYMPHOCYTES NFR BLD: 1.27 K/UL (ref 1.1–3.7)
LYMPHOCYTES RELATIVE PERCENT: 22 % (ref 24–43)
MCH RBC QN AUTO: 28.4 PG (ref 25.2–33.5)
MCHC RBC AUTO-ENTMCNC: 33 G/DL (ref 28.4–34.8)
MCV RBC AUTO: 86.2 FL (ref 82.6–102.9)
MICROORGANISM SPEC CULT: NORMAL
MONOCYTES NFR BLD: 0.38 K/UL (ref 0.1–1.2)
MONOCYTES NFR BLD: 7 % (ref 3–12)
N GONORRHOEA DNA UR QL NAA+PROBE: NEGATIVE
NEUTROPHILS NFR BLD: 70 % (ref 36–65)
NEUTS SEG NFR BLD: 4.02 K/UL (ref 1.5–8.1)
NRBC BLD-RTO: 0 PER 100 WBC
PLATELET # BLD AUTO: 214 K/UL (ref 138–453)
PMV BLD AUTO: 10.3 FL (ref 8.1–13.5)
RBC # BLD AUTO: 4.43 M/UL (ref 3.95–5.11)
RUBV IGG SERPL QL IA: 19.52 IU/ML
SPECIMEN DESCRIPTION: NORMAL
SPECIMEN DESCRIPTION: NORMAL
T PALLIDUM AB SER QL IA: NONREACTIVE
WBC OTHER # BLD: 5.8 K/UL (ref 3.5–11.3)

## 2023-12-04 DIAGNOSIS — R11.2 NAUSEA AND VOMITING, UNSPECIFIED VOMITING TYPE: ICD-10-CM

## 2023-12-04 RX ORDER — ONDANSETRON 4 MG/1
4 TABLET, FILM COATED ORAL 3 TIMES DAILY PRN
Qty: 30 TABLET | Refills: 3 | Status: SHIPPED | OUTPATIENT
Start: 2023-12-04

## 2023-12-14 ENCOUNTER — ROUTINE PRENATAL (OUTPATIENT)
Dept: OBGYN | Age: 23
End: 2023-12-14

## 2023-12-14 VITALS
SYSTOLIC BLOOD PRESSURE: 118 MMHG | HEART RATE: 80 BPM | WEIGHT: 184.8 LBS | BODY MASS INDEX: 29.83 KG/M2 | DIASTOLIC BLOOD PRESSURE: 78 MMHG

## 2023-12-14 DIAGNOSIS — Z34.81 ENCOUNTER FOR SUPERVISION OF OTHER NORMAL PREGNANCY IN FIRST TRIMESTER: ICD-10-CM

## 2023-12-14 DIAGNOSIS — Z3A.10 10 WEEKS GESTATION OF PREGNANCY: Primary | ICD-10-CM

## 2024-01-11 ENCOUNTER — ROUTINE PRENATAL (OUTPATIENT)
Dept: OBGYN | Age: 24
End: 2024-01-11

## 2024-01-11 VITALS
SYSTOLIC BLOOD PRESSURE: 114 MMHG | WEIGHT: 183 LBS | HEART RATE: 83 BPM | BODY MASS INDEX: 29.54 KG/M2 | DIASTOLIC BLOOD PRESSURE: 72 MMHG

## 2024-01-11 DIAGNOSIS — Z3A.14 14 WEEKS GESTATION OF PREGNANCY: Primary | ICD-10-CM

## 2024-01-11 DIAGNOSIS — Z34.82 ENCOUNTER FOR SUPERVISION OF OTHER NORMAL PREGNANCY, SECOND TRIMESTER: ICD-10-CM

## 2024-01-11 ASSESSMENT — PATIENT HEALTH QUESTIONNAIRE - PHQ9
3. TROUBLE FALLING OR STAYING ASLEEP: 2
5. POOR APPETITE OR OVEREATING: 0
SUM OF ALL RESPONSES TO PHQ9 QUESTIONS 1 & 2: 0
2. FEELING DOWN, DEPRESSED OR HOPELESS: 0
SUM OF ALL RESPONSES TO PHQ QUESTIONS 1-9: 3
8. MOVING OR SPEAKING SO SLOWLY THAT OTHER PEOPLE COULD HAVE NOTICED. OR THE OPPOSITE, BEING SO FIGETY OR RESTLESS THAT YOU HAVE BEEN MOVING AROUND A LOT MORE THAN USUAL: 0
9. THOUGHTS THAT YOU WOULD BE BETTER OFF DEAD, OR OF HURTING YOURSELF: 0
SUM OF ALL RESPONSES TO PHQ QUESTIONS 1-9: 3
4. FEELING TIRED OR HAVING LITTLE ENERGY: 1
1. LITTLE INTEREST OR PLEASURE IN DOING THINGS: 0
6. FEELING BAD ABOUT YOURSELF - OR THAT YOU ARE A FAILURE OR HAVE LET YOURSELF OR YOUR FAMILY DOWN: 0
SUM OF ALL RESPONSES TO PHQ QUESTIONS 1-9: 3
10. IF YOU CHECKED OFF ANY PROBLEMS, HOW DIFFICULT HAVE THESE PROBLEMS MADE IT FOR YOU TO DO YOUR WORK, TAKE CARE OF THINGS AT HOME, OR GET ALONG WITH OTHER PEOPLE: 0
7. TROUBLE CONCENTRATING ON THINGS, SUCH AS READING THE NEWSPAPER OR WATCHING TELEVISION: 0
SUM OF ALL RESPONSES TO PHQ QUESTIONS 1-9: 3

## 2024-01-11 NOTE — PROGRESS NOTES
1. 14 weeks gestation of pregnancy        2. Encounter for supervision of other normal pregnancy, second trimester                  I am having Sara Rodriguez maintain her Prenatal Vit-Fe Fumarate-FA (PRENATAL VITAMINS PO), norgestimate-ethinyl estradiol, Prenatal Vitamins, and ondansetron.    Return in about 3 weeks (around 2/1/2024) for OB, OB 20 wk feb 20 & 30 wk with tdap 4/30 &  march 13 ob & apil 12 GTT.    There are no Patient Instructions on file for this visit.             OSWALDO Redmond CNM,1/11/2024 6:16 PM

## 2024-02-05 ENCOUNTER — ROUTINE PRENATAL (OUTPATIENT)
Dept: OBGYN | Age: 24
End: 2024-02-05
Payer: COMMERCIAL

## 2024-02-05 VITALS
WEIGHT: 184 LBS | SYSTOLIC BLOOD PRESSURE: 118 MMHG | BODY MASS INDEX: 29.7 KG/M2 | DIASTOLIC BLOOD PRESSURE: 74 MMHG | HEART RATE: 76 BPM

## 2024-02-05 DIAGNOSIS — Z34.82 ENCOUNTER FOR SUPERVISION OF OTHER NORMAL PREGNANCY, SECOND TRIMESTER: ICD-10-CM

## 2024-02-05 DIAGNOSIS — Z3A.18 18 WEEKS GESTATION OF PREGNANCY: Primary | ICD-10-CM

## 2024-02-05 PROCEDURE — G8417 CALC BMI ABV UP PARAM F/U: HCPCS | Performed by: ADVANCED PRACTICE MIDWIFE

## 2024-02-05 PROCEDURE — G8484 FLU IMMUNIZE NO ADMIN: HCPCS | Performed by: ADVANCED PRACTICE MIDWIFE

## 2024-02-05 PROCEDURE — G8427 DOCREV CUR MEDS BY ELIG CLIN: HCPCS | Performed by: ADVANCED PRACTICE MIDWIFE

## 2024-02-05 PROCEDURE — 99213 OFFICE O/P EST LOW 20 MIN: CPT | Performed by: ADVANCED PRACTICE MIDWIFE

## 2024-02-05 PROCEDURE — 1036F TOBACCO NON-USER: CPT | Performed by: ADVANCED PRACTICE MIDWIFE

## 2024-02-05 SDOH — ECONOMIC STABILITY: FOOD INSECURITY: WITHIN THE PAST 12 MONTHS, THE FOOD YOU BOUGHT JUST DIDN'T LAST AND YOU DIDN'T HAVE MONEY TO GET MORE.: NEVER TRUE

## 2024-02-05 SDOH — ECONOMIC STABILITY: FOOD INSECURITY: WITHIN THE PAST 12 MONTHS, YOU WORRIED THAT YOUR FOOD WOULD RUN OUT BEFORE YOU GOT MONEY TO BUY MORE.: NEVER TRUE

## 2024-02-05 SDOH — ECONOMIC STABILITY: TRANSPORTATION INSECURITY
IN THE PAST 12 MONTHS, HAS LACK OF TRANSPORTATION KEPT YOU FROM MEETINGS, WORK, OR FROM GETTING THINGS NEEDED FOR DAILY LIVING?: NO

## 2024-02-05 SDOH — ECONOMIC STABILITY: HOUSING INSECURITY
IN THE LAST 12 MONTHS, WAS THERE A TIME WHEN YOU DID NOT HAVE A STEADY PLACE TO SLEEP OR SLEPT IN A SHELTER (INCLUDING NOW)?: NO

## 2024-02-05 SDOH — ECONOMIC STABILITY: INCOME INSECURITY: HOW HARD IS IT FOR YOU TO PAY FOR THE VERY BASICS LIKE FOOD, HOUSING, MEDICAL CARE, AND HEATING?: NOT HARD AT ALL

## 2024-02-05 NOTE — PROGRESS NOTES
Sara Rodriguez is here at 18w2d for:    Chief Complaint   Patient presents with    Routine Prenatal Visit     Pt states no issues or concerns        Estimated Due Date: Estimated Date of Delivery: 24    OB History    Para Term  AB Living   5 4 4 0 0 4   SAB IAB Ectopic Molar Multiple Live Births   0 0 0   0 4      # Outcome Date GA Lbr Abdiel/2nd Weight Sex Delivery Anes PTL Lv   5 Current            4 Term 22 38w6d  3.138 kg (6 lb 14.7 oz) F Vag-Spont None N KAY   3 Term 20 38w3d / 00:04 3.081 kg (6 lb 12.7 oz) M Vag-Spont None N KAY   2 Term 19 38w6d 05:11 / 00:01 2.747 kg (6 lb 0.9 oz) F Vag-Spont None N KAY      Complications: Oligohydramnios   1 Term 05/15/17 39w5d 12:11 / 00:52 3.388 kg (7 lb 7.5 oz) M Vag-Spont EPI N KAY        Past Medical History:   Diagnosis Date    Ankle fracture     R. growth plate area    Clavicle fracture  and     Major depression, chronic 2020       Past Surgical History:   Procedure Laterality Date    GALLBLADDER SURGERY      WISDOM TOOTH EXTRACTION         Social History     Tobacco Use   Smoking Status Never    Passive exposure: Yes   Smokeless Tobacco Never        Social History     Substance and Sexual Activity   Alcohol Use Not Currently               HPI: PT here today states she is doing well but has been having bad dreams about the baby    Yes PT denies fever, chills, nausea and vomiting       Vitals:  Estimated body mass index is 29.7 kg/m² as calculated from the following:    Height as of 10/17/23: 1.676 m (5' 6\").    Weight as of this encounter: 83.5 kg (184 lb).  BP: 118/74  Weight - Scale: 83.5 kg (184 lb)  Pulse: 76  Patient Position: Sitting  Albumin: Negative  Glucose: Negative  Fetal HR: 154  Movement: Absent           Abdomen: soft u/u    Results reviewed today:    No results found for this visit on 24.        ASSESSMENT & Plan    Diagnosis Orders   1. 18 weeks gestation of pregnancy        2. Encounter

## 2024-02-20 ENCOUNTER — ROUTINE PRENATAL (OUTPATIENT)
Dept: OBGYN | Age: 24
End: 2024-02-20
Payer: COMMERCIAL

## 2024-02-20 VITALS
WEIGHT: 185.6 LBS | DIASTOLIC BLOOD PRESSURE: 72 MMHG | BODY MASS INDEX: 29.96 KG/M2 | HEART RATE: 83 BPM | SYSTOLIC BLOOD PRESSURE: 114 MMHG

## 2024-02-20 DIAGNOSIS — Z34.82 ENCOUNTER FOR SUPERVISION OF OTHER NORMAL PREGNANCY, SECOND TRIMESTER: ICD-10-CM

## 2024-02-20 DIAGNOSIS — Z36.89 SCREENING, ANTENATAL, FOR FETAL ANATOMIC SURVEY: ICD-10-CM

## 2024-02-20 DIAGNOSIS — Z3A.20 20 WEEKS GESTATION OF PREGNANCY: Primary | ICD-10-CM

## 2024-02-20 PROCEDURE — 99213 OFFICE O/P EST LOW 20 MIN: CPT | Performed by: ADVANCED PRACTICE MIDWIFE

## 2024-02-20 PROCEDURE — 1036F TOBACCO NON-USER: CPT | Performed by: ADVANCED PRACTICE MIDWIFE

## 2024-02-20 PROCEDURE — G8417 CALC BMI ABV UP PARAM F/U: HCPCS | Performed by: ADVANCED PRACTICE MIDWIFE

## 2024-02-20 PROCEDURE — G8484 FLU IMMUNIZE NO ADMIN: HCPCS | Performed by: ADVANCED PRACTICE MIDWIFE

## 2024-02-20 PROCEDURE — G8427 DOCREV CUR MEDS BY ELIG CLIN: HCPCS | Performed by: ADVANCED PRACTICE MIDWIFE

## 2024-02-20 NOTE — PROGRESS NOTES
Sara Rodriguez is here at 20w3d for:    Chief Complaint   Patient presents with    Routine Prenatal Visit     F/U in house 20 week u/s. Boy,  Pt c/o chest pain for the past 2 weeks , it feels deep and its dull. When pt moves its worse        Estimated Due Date: Estimated Date of Delivery: 24    OB History    Para Term  AB Living   5 4 4 0 0 4   SAB IAB Ectopic Molar Multiple Live Births   0 0 0   0 4      # Outcome Date GA Lbr Abdiel/2nd Weight Sex Delivery Anes PTL Lv   5 Current            4 Term 22 38w6d  3.138 kg (6 lb 14.7 oz) F Vag-Spont None N KAY   3 Term 20 38w3d / 00:04 3.081 kg (6 lb 12.7 oz) M Vag-Spont None N KAY   2 Term 19 38w6d 05:11 / 00:01 2.747 kg (6 lb 0.9 oz) F Vag-Spont None N KAY      Complications: Oligohydramnios   1 Term 05/15/17 39w5d 12:11 / 00:52 3.388 kg (7 lb 7.5 oz) M Vag-Spont EPI N KAY        Past Medical History:   Diagnosis Date    Ankle fracture     R. growth plate area    Clavicle fracture  and     Major depression, chronic 2020       Past Surgical History:   Procedure Laterality Date    GALLBLADDER SURGERY      WISDOM TOOTH EXTRACTION         Social History     Tobacco Use   Smoking Status Never    Passive exposure: Yes   Smokeless Tobacco Never        Social History     Substance and Sexual Activity   Alcohol Use Not Currently               HPI: Pt here today we did review u/s and will plan repeat next visit    Yes PT denies fever, chills, nausea and vomiting       Vitals:  Estimated body mass index is 29.96 kg/m² as calculated from the following:    Height as of 10/17/23: 1.676 m (5' 6\").    Weight as of this encounter: 84.2 kg (185 lb 9.6 oz).  BP: 114/72  Weight - Scale: 84.2 kg (185 lb 9.6 oz)  Pulse: 83  Patient Position: Sitting  Albumin: Negative  Glucose: Negative  Fetal HR: 155  Movement: Present  Comments: placenta 0.6 from cervix           Abdomen: soft    Results reviewed today:    2024 11:14 AM EST